# Patient Record
Sex: MALE | Race: WHITE | NOT HISPANIC OR LATINO | Employment: OTHER | URBAN - METROPOLITAN AREA
[De-identification: names, ages, dates, MRNs, and addresses within clinical notes are randomized per-mention and may not be internally consistent; named-entity substitution may affect disease eponyms.]

---

## 2017-01-16 ENCOUNTER — TRANSCRIBE ORDERS (OUTPATIENT)
Dept: ADMINISTRATIVE | Facility: HOSPITAL | Age: 82
End: 2017-01-16

## 2017-01-16 ENCOUNTER — HOSPITAL ENCOUNTER (OUTPATIENT)
Dept: CT IMAGING | Facility: HOSPITAL | Age: 82
Discharge: HOME | End: 2017-01-16
Attending: INTERNAL MEDICINE | Admitting: INTERNAL MEDICINE

## 2017-01-16 ENCOUNTER — LAB (OUTPATIENT)
Dept: LAB | Facility: HOSPITAL | Age: 82
End: 2017-01-16
Attending: INTERNAL MEDICINE

## 2017-01-16 DIAGNOSIS — J84.10 PULMONARY FIBROSIS (HCC): Primary | ICD-10-CM

## 2017-01-16 DIAGNOSIS — J84.10 PULMONARY FIBROSIS (HCC): ICD-10-CM

## 2017-01-16 DIAGNOSIS — R07.9 CHEST PAIN, UNSPECIFIED TYPE: ICD-10-CM

## 2017-01-16 DIAGNOSIS — R05.9 COUGH: ICD-10-CM

## 2017-01-16 DIAGNOSIS — R07.9 CHEST PAIN, UNSPECIFIED TYPE: Primary | ICD-10-CM

## 2017-01-16 LAB
B PERT DNA SPEC QL NAA+PROBE: NOT DETECTED
BASOPHILS # BLD AUTO: 0.02 10*3/MM3 (ref 0–0.2)
BASOPHILS NFR BLD AUTO: 0.2 % (ref 0–1.5)
C PNEUM DNA NPH QL NAA+NON-PROBE: NOT DETECTED
D DIMER PPP FEU-MCNC: 1.73 MCGFEU/ML (ref 0–0.49)
DEPRECATED RDW RBC AUTO: 47.2 FL (ref 37–54)
EOSINOPHIL # BLD AUTO: 0.31 10*3/MM3 (ref 0–0.7)
EOSINOPHIL NFR BLD AUTO: 3.7 % (ref 0.3–6.2)
ERYTHROCYTE [DISTWIDTH] IN BLOOD BY AUTOMATED COUNT: 13.3 % (ref 11.5–14.5)
FLUAV H1 2009 PAND RNA NPH QL NAA+PROBE: NOT DETECTED
FLUAV H1 HA GENE NPH QL NAA+PROBE: NOT DETECTED
FLUAV H3 RNA NPH QL NAA+PROBE: NOT DETECTED
FLUAV SUBTYP SPEC NAA+PROBE: NOT DETECTED
FLUBV RNA ISLT QL NAA+PROBE: NOT DETECTED
HADV DNA SPEC NAA+PROBE: NOT DETECTED
HCOV 229E RNA SPEC QL NAA+PROBE: NOT DETECTED
HCOV HKU1 RNA SPEC QL NAA+PROBE: NOT DETECTED
HCOV NL63 RNA SPEC QL NAA+PROBE: NOT DETECTED
HCOV OC43 RNA SPEC QL NAA+PROBE: NOT DETECTED
HCT VFR BLD AUTO: 34.5 % (ref 40.4–52.2)
HGB BLD-MCNC: 11.2 G/DL (ref 13.7–17.6)
HMPV RNA NPH QL NAA+NON-PROBE: NOT DETECTED
HPIV1 RNA SPEC QL NAA+PROBE: NOT DETECTED
HPIV2 RNA SPEC QL NAA+PROBE: NOT DETECTED
HPIV3 RNA NPH QL NAA+PROBE: NOT DETECTED
HPIV4 P GENE NPH QL NAA+PROBE: NOT DETECTED
IMM GRANULOCYTES # BLD: 0.03 10*3/MM3 (ref 0–0.03)
IMM GRANULOCYTES NFR BLD: 0.4 % (ref 0–0.5)
LYMPHOCYTES # BLD AUTO: 1.94 10*3/MM3 (ref 0.9–4.8)
LYMPHOCYTES NFR BLD AUTO: 22.9 % (ref 19.6–45.3)
M PNEUMO IGG SER IA-ACNC: NOT DETECTED
MCH RBC QN AUTO: 31.6 PG (ref 27–32.7)
MCHC RBC AUTO-ENTMCNC: 32.5 G/DL (ref 32.6–36.4)
MCV RBC AUTO: 97.5 FL (ref 79.8–96.2)
MONOCYTES # BLD AUTO: 0.61 10*3/MM3 (ref 0.2–1.2)
MONOCYTES NFR BLD AUTO: 7.2 % (ref 5–12)
NEUTROPHILS # BLD AUTO: 5.55 10*3/MM3 (ref 1.9–8.1)
NEUTROPHILS NFR BLD AUTO: 65.6 % (ref 42.7–76)
NT-PROBNP SERPL-MCNC: 712.3 PG/ML (ref 0–1800)
PLATELET # BLD AUTO: 181 10*3/MM3 (ref 140–500)
PMV BLD AUTO: 9.2 FL (ref 6–12)
PROCALCITONIN SERPL-MCNC: 0.07 NG/ML (ref 0.1–0.25)
RBC # BLD AUTO: 3.54 10*6/MM3 (ref 4.6–6)
RHINOVIRUS RNA SPEC NAA+PROBE: NOT DETECTED
RSV RNA NPH QL NAA+NON-PROBE: NOT DETECTED
TROPONIN T SERPL-MCNC: <0.01 NG/ML (ref 0–0.03)
WBC NRBC COR # BLD: 8.46 10*3/MM3 (ref 4.5–10.7)

## 2017-01-16 PROCEDURE — 84484 ASSAY OF TROPONIN QUANT: CPT

## 2017-01-16 PROCEDURE — 87798 DETECT AGENT NOS DNA AMP: CPT

## 2017-01-16 PROCEDURE — 36415 COLL VENOUS BLD VENIPUNCTURE: CPT

## 2017-01-16 PROCEDURE — 87633 RESP VIRUS 12-25 TARGETS: CPT

## 2017-01-16 PROCEDURE — 83880 ASSAY OF NATRIURETIC PEPTIDE: CPT

## 2017-01-16 PROCEDURE — 87581 M.PNEUMON DNA AMP PROBE: CPT

## 2017-01-16 PROCEDURE — 85025 COMPLETE CBC W/AUTO DIFF WBC: CPT

## 2017-01-16 PROCEDURE — 71250 CT THORAX DX C-: CPT

## 2017-01-16 PROCEDURE — 85379 FIBRIN DEGRADATION QUANT: CPT

## 2017-01-16 PROCEDURE — 84145 PROCALCITONIN (PCT): CPT

## 2017-01-16 PROCEDURE — 87486 CHLMYD PNEUM DNA AMP PROBE: CPT

## 2017-01-20 ENCOUNTER — TRANSCRIBE ORDERS (OUTPATIENT)
Dept: ADMINISTRATIVE | Facility: HOSPITAL | Age: 82
End: 2017-01-20

## 2017-01-20 DIAGNOSIS — R06.02 SOB (SHORTNESS OF BREATH): Primary | ICD-10-CM

## 2017-01-23 ENCOUNTER — HOSPITAL ENCOUNTER (OUTPATIENT)
Dept: NUCLEAR MEDICINE | Facility: HOSPITAL | Age: 82
Discharge: HOME | End: 2017-01-23
Attending: INTERNAL MEDICINE

## 2017-01-23 ENCOUNTER — HOSPITAL ENCOUNTER (OUTPATIENT)
Dept: GENERAL RADIOLOGY | Facility: HOSPITAL | Age: 82
Discharge: HOME | End: 2017-01-23
Attending: INTERNAL MEDICINE | Admitting: INTERNAL MEDICINE

## 2017-01-23 DIAGNOSIS — R06.02 SOB (SHORTNESS OF BREATH): ICD-10-CM

## 2017-01-23 PROCEDURE — 0 TECHNETIUM ALBUMIN AGGREGATED: Performed by: INTERNAL MEDICINE

## 2017-01-23 PROCEDURE — A9540 TC99M MAA: HCPCS | Performed by: INTERNAL MEDICINE

## 2017-01-23 PROCEDURE — A9558 XE133 XENON 10MCI: HCPCS | Performed by: INTERNAL MEDICINE

## 2017-01-23 PROCEDURE — 71020 HC CHEST PA AND LATERAL: CPT

## 2017-01-23 PROCEDURE — 0 XENON XE 133: Performed by: INTERNAL MEDICINE

## 2017-01-23 PROCEDURE — 78582 LUNG VENTILAT&PERFUS IMAGING: CPT

## 2017-01-23 RX ADMIN — Medication 13.5 MILLICURIE: at 14:30

## 2017-01-23 RX ADMIN — Medication 1 DOSE: at 14:31

## 2017-03-18 ENCOUNTER — RESULTS ENCOUNTER (OUTPATIENT)
Dept: FAMILY MEDICINE CLINIC | Facility: CLINIC | Age: 82
End: 2017-03-18

## 2017-03-18 DIAGNOSIS — K21.9 GASTROESOPHAGEAL REFLUX DISEASE, ESOPHAGITIS PRESENCE NOT SPECIFIED: ICD-10-CM

## 2017-03-18 DIAGNOSIS — Z12.5 SCREENING FOR PROSTATE CANCER: ICD-10-CM

## 2017-03-18 DIAGNOSIS — I10 ESSENTIAL HYPERTENSION: ICD-10-CM

## 2017-03-18 DIAGNOSIS — E78.2 MIXED HYPERLIPIDEMIA: ICD-10-CM

## 2017-03-18 DIAGNOSIS — E03.9 HYPOTHYROIDISM, ACQUIRED: ICD-10-CM

## 2017-03-18 DIAGNOSIS — G25.81 RLS (RESTLESS LEGS SYNDROME): ICD-10-CM

## 2017-04-05 DIAGNOSIS — G62.9 NEUROPATHY: ICD-10-CM

## 2017-04-05 RX ORDER — GABAPENTIN 300 MG/1
CAPSULE ORAL
Qty: 90 CAPSULE | Refills: 1 | OUTPATIENT
Start: 2017-04-05

## 2017-04-18 ENCOUNTER — OFFICE VISIT (OUTPATIENT)
Dept: FAMILY MEDICINE CLINIC | Facility: CLINIC | Age: 82
End: 2017-04-18

## 2017-04-18 VITALS
SYSTOLIC BLOOD PRESSURE: 105 MMHG | HEART RATE: 63 BPM | WEIGHT: 126 LBS | HEIGHT: 71 IN | RESPIRATION RATE: 16 BRPM | TEMPERATURE: 97.3 F | DIASTOLIC BLOOD PRESSURE: 66 MMHG | OXYGEN SATURATION: 100 % | BODY MASS INDEX: 17.64 KG/M2

## 2017-04-18 DIAGNOSIS — G25.81 RLS (RESTLESS LEGS SYNDROME): ICD-10-CM

## 2017-04-18 DIAGNOSIS — E03.9 HYPOTHYROIDISM, ACQUIRED: ICD-10-CM

## 2017-04-18 DIAGNOSIS — R41.3 MEMORY IMPAIRMENT: ICD-10-CM

## 2017-04-18 DIAGNOSIS — G62.9 NEUROPATHY: ICD-10-CM

## 2017-04-18 DIAGNOSIS — K21.9 GASTROESOPHAGEAL REFLUX DISEASE WITHOUT ESOPHAGITIS: ICD-10-CM

## 2017-04-18 DIAGNOSIS — I10 ESSENTIAL HYPERTENSION: ICD-10-CM

## 2017-04-18 DIAGNOSIS — K21.9 GASTROESOPHAGEAL REFLUX DISEASE, ESOPHAGITIS PRESENCE NOT SPECIFIED: ICD-10-CM

## 2017-04-18 PROCEDURE — 99214 OFFICE O/P EST MOD 30 MIN: CPT | Performed by: FAMILY MEDICINE

## 2017-04-18 RX ORDER — GABAPENTIN 300 MG/1
300 CAPSULE ORAL DAILY
Qty: 90 CAPSULE | Refills: 1 | Status: SHIPPED | OUTPATIENT
Start: 2017-04-18 | End: 2017-07-06 | Stop reason: SDUPTHER

## 2017-04-18 RX ORDER — DONEPEZIL HYDROCHLORIDE 10 MG/1
10 TABLET, FILM COATED ORAL NIGHTLY
Qty: 90 TABLET | Refills: 1 | Status: CANCELLED | OUTPATIENT
Start: 2017-04-18

## 2017-04-18 RX ORDER — LEVOTHYROXINE SODIUM 0.05 MG/1
50 TABLET ORAL DAILY
Qty: 90 TABLET | Refills: 1 | Status: SHIPPED | OUTPATIENT
Start: 2017-04-18 | End: 2017-06-16

## 2017-04-18 RX ORDER — PRAVASTATIN SODIUM 20 MG
20 TABLET ORAL DAILY
Qty: 90 TABLET | Refills: 1 | Status: SHIPPED | OUTPATIENT
Start: 2017-04-18 | End: 2018-02-06 | Stop reason: SDUPTHER

## 2017-04-18 RX ORDER — PANTOPRAZOLE SODIUM 40 MG/1
40 TABLET, DELAYED RELEASE ORAL DAILY
Qty: 90 TABLET | Refills: 1 | Status: SHIPPED | OUTPATIENT
Start: 2017-04-18 | End: 2017-10-23 | Stop reason: SDUPTHER

## 2017-04-18 RX ORDER — ROPINIROLE 1 MG/1
1 TABLET, FILM COATED ORAL DAILY
Qty: 90 TABLET | Refills: 1 | Status: SHIPPED | OUTPATIENT
Start: 2017-04-18 | End: 2017-10-23 | Stop reason: SDUPTHER

## 2017-04-18 RX ORDER — LISINOPRIL 2.5 MG/1
2.5 TABLET ORAL DAILY
Qty: 90 TABLET | Refills: 1 | Status: SHIPPED | OUTPATIENT
Start: 2017-04-18 | End: 2018-02-06 | Stop reason: SDUPTHER

## 2017-04-18 NOTE — PROGRESS NOTES
Subjective   Tye Connor is a 84 y.o. male.     History of Present Illness   Chief Complaint:   Chief Complaint   Patient presents with   • Heartburn   • Hyperlipidemia   • Hypertension   • Memory Loss   • Restless Legs Syndrome       Tye Connor 84 y.o. male who presents today for Medical Management of the below listed issues and medication refills. He did not have labs requested prior to his appointment. Patient stopped taking his Aricept. He stated he does not want to continue this medication. Patient will have immunization records faxed to us from [facility].   he has a history of   Patient Active Problem List   Diagnosis   • Back pain   • Hx of CABG   • Hyperlipidemia   • Hypothyroidism, acquired   • Kidney calculus   • Neuropathy   • History of carotid endarterectomy   • Essential hypertension   • Acid reflux   • Gastric ulcer   • Compression fracture of thoracic vertebra   • Hypertrophic polyarthritis   • Gait instability   • Memory impairment   • Pulmonary fibrosis   .  Since the last visit, he has overall felt well.  he has been compliant with   Current Outpatient Prescriptions:   •  calcium citrate-vitamin d (CALCITRATE) 315-250 MG-UNIT tablet tablet, Take  by mouth daily., Disp: , Rfl:   •  cyanocobalamin 100 MCG tablet, Take by mouth., Disp: , Rfl:   •  gabapentin (NEURONTIN) 300 MG capsule, Take 1 capsule by mouth Daily., Disp: 90 capsule, Rfl: 1  •  Ginkgo Biloba 100 MG capsule, Take  by mouth., Disp: , Rfl:   •  guaifenesin-codeine (GUAIFENESIN AC) 100-10 MG/5ML liquid, Take 5 mL by mouth 3 (three) times a day as needed for cough., Disp: 120 mL, Rfl: 0  •  levothyroxine (SYNTHROID, LEVOTHROID) 50 MCG tablet, Take 1 tablet by mouth Daily., Disp: 90 tablet, Rfl: 0  •  lisinopril (PRINIVIL,ZESTRIL) 2.5 MG tablet, Take 1 tablet by mouth Daily., Disp: 90 tablet, Rfl: 1  •  Multiple Vitamins-Minerals (PRESERVISION AREDS) capsule, Take by mouth., Disp: , Rfl:   •  pantoprazole (PROTONIX) 40 MG EC  "tablet, Take 1 tablet by mouth Daily., Disp: 90 tablet, Rfl: 1  •  pravastatin (PRAVACHOL) 20 MG tablet, Take 1 tablet by mouth Daily., Disp: 90 tablet, Rfl: 1  •  rOPINIRole (REQUIP) 1 MG tablet, Take 1 tablet by mouth Daily., Disp: 90 tablet, Rfl: 1.  he denies medication side effects.    All of the chronic condition(s) listed above are stable w/o issues.    /66  Pulse 63  Temp 97.3 °F (36.3 °C) (Oral)   Resp 16  Ht 71\" (180.3 cm)  Wt 126 lb (57.2 kg)  SpO2 100%  BMI 17.57 kg/m2    The following portions of the patient's history were reviewed and updated as appropriate: allergies, current medications, past family history, past medical history, past social history, past surgical history and problem list.    Review of Systems   Constitutional: Negative for activity change, appetite change and unexpected weight change.   Eyes: Negative for visual disturbance.   Respiratory: Negative for chest tightness and shortness of breath.    Cardiovascular: Negative for chest pain and palpitations.   Skin: Negative for color change.   Neurological: Negative for syncope and speech difficulty.   Psychiatric/Behavioral: Negative for decreased concentration.       Objective   Physical Exam   Constitutional: He is oriented to person, place, and time. He appears well-developed and well-nourished.   HENT:   Head: Atraumatic.   Mouth/Throat: Oropharynx is clear and moist.   Eyes: EOM are normal. Pupils are equal, round, and reactive to light.   Neck: Normal range of motion. Neck supple. No thyromegaly present.   Cardiovascular: Normal rate and regular rhythm.    Pulmonary/Chest: Effort normal and breath sounds normal.   Abdominal: Soft.   Musculoskeletal: Normal range of motion.   Neurological: He is alert and oriented to person, place, and time.   Skin: Skin is warm and dry.   Psychiatric: He has a normal mood and affect. His behavior is normal.   Nursing note and vitals reviewed.      Assessment/Plan   Tye was seen " today for heartburn, hyperlipidemia, hypertension, memory loss and restless legs syndrome.    Diagnoses and all orders for this visit:    Memory impairment  -     Ambulatory Referral to Neurology    Neuropathy  -     gabapentin (NEURONTIN) 300 MG capsule; Take 1 capsule by mouth Daily.  -     Ambulatory Referral to Neurology    Hypothyroidism, acquired  -     levothyroxine (SYNTHROID, LEVOTHROID) 50 MCG tablet; Take 1 tablet by mouth Daily.    Essential hypertension  -     lisinopril (PRINIVIL,ZESTRIL) 2.5 MG tablet; Take 1 tablet by mouth Daily.    Gastroesophageal reflux disease without esophagitis  -     pantoprazole (PROTONIX) 40 MG EC tablet; Take 1 tablet by mouth Daily.    Gastroesophageal reflux disease, esophagitis presence not specified  -     pravastatin (PRAVACHOL) 20 MG tablet; Take 1 tablet by mouth Daily.    RLS (restless legs syndrome)  -     rOPINIRole (REQUIP) 1 MG tablet; Take 1 tablet by mouth Daily.    Other orders  -     Cancel: donepezil (ARICEPT) 10 MG tablet; Take 1 tablet by mouth Every Night.

## 2017-04-18 NOTE — PATIENT INSTRUCTIONS
Exercise 30 minutes most days of the week  Sleep 6-8 hours each night if possible  Low fat, low cholesterol diet   we discussed prescribed medications and how to take them   make sure you get results of any labs/studies ordered today  Low glycemic index diet   Get labs and see me

## 2017-04-25 LAB
ALBUMIN SERPL-MCNC: 3.5 G/DL (ref 3.5–5.2)
ALBUMIN/GLOB SERPL: 0.8 G/DL
ALP SERPL-CCNC: 68 U/L (ref 39–117)
ALT SERPL-CCNC: 11 U/L (ref 1–41)
AST SERPL-CCNC: 21 U/L (ref 1–40)
BASOPHILS # BLD AUTO: 0.01 10*3/MM3 (ref 0–0.2)
BASOPHILS NFR BLD AUTO: 0.1 % (ref 0–1.5)
BILIRUB SERPL-MCNC: 0.7 MG/DL (ref 0.1–1.2)
BUN SERPL-MCNC: 15 MG/DL (ref 8–23)
BUN/CREAT SERPL: 13.9 (ref 7–25)
CALCIUM SERPL-MCNC: 9.4 MG/DL (ref 8.6–10.5)
CHLORIDE SERPL-SCNC: 100 MMOL/L (ref 98–107)
CHOLEST SERPL-MCNC: 150 MG/DL (ref 0–200)
CO2 SERPL-SCNC: 26.3 MMOL/L (ref 22–29)
CREAT SERPL-MCNC: 1.08 MG/DL (ref 0.76–1.27)
EOSINOPHIL # BLD AUTO: 0.23 10*3/MM3 (ref 0–0.7)
EOSINOPHIL NFR BLD AUTO: 3.1 % (ref 0.3–6.2)
ERYTHROCYTE [DISTWIDTH] IN BLOOD BY AUTOMATED COUNT: 14.2 % (ref 11.5–14.5)
GLOBULIN SER CALC-MCNC: 4.2 GM/DL
GLUCOSE SERPL-MCNC: 94 MG/DL (ref 65–99)
HCT VFR BLD AUTO: 34.4 % (ref 40.4–52.2)
HDLC SERPL-MCNC: 41 MG/DL (ref 40–60)
HGB BLD-MCNC: 11.2 G/DL (ref 13.7–17.6)
IMM GRANULOCYTES # BLD: 0.03 10*3/MM3 (ref 0–0.03)
IMM GRANULOCYTES NFR BLD: 0.4 % (ref 0–0.5)
LDLC SERPL CALC-MCNC: 87 MG/DL (ref 0–100)
LYMPHOCYTES # BLD AUTO: 2.8 10*3/MM3 (ref 0.9–4.8)
LYMPHOCYTES NFR BLD AUTO: 38.2 % (ref 19.6–45.3)
MCH RBC QN AUTO: 31.7 PG (ref 27–32.7)
MCHC RBC AUTO-ENTMCNC: 32.6 G/DL (ref 32.6–36.4)
MCV RBC AUTO: 97.5 FL (ref 79.8–96.2)
MONOCYTES # BLD AUTO: 0.55 10*3/MM3 (ref 0.2–1.2)
MONOCYTES NFR BLD AUTO: 7.5 % (ref 5–12)
NEUTROPHILS # BLD AUTO: 3.71 10*3/MM3 (ref 1.9–8.1)
NEUTROPHILS NFR BLD AUTO: 50.7 % (ref 42.7–76)
PLATELET # BLD AUTO: 191 10*3/MM3 (ref 140–500)
POTASSIUM SERPL-SCNC: 4.1 MMOL/L (ref 3.5–5.2)
PROT SERPL-MCNC: 7.7 G/DL (ref 6–8.5)
PSA SERPL-MCNC: 0.56 NG/ML (ref 0–4)
RBC # BLD AUTO: 3.53 10*6/MM3 (ref 4.6–6)
SODIUM SERPL-SCNC: 140 MMOL/L (ref 136–145)
TRIGL SERPL-MCNC: 110 MG/DL (ref 0–150)
TSH SERPL DL<=0.005 MIU/L-ACNC: 5.68 MIU/ML (ref 0.27–4.2)
VLDLC SERPL CALC-MCNC: 22 MG/DL (ref 5–40)
WBC # BLD AUTO: 7.33 10*3/MM3 (ref 4.5–10.7)

## 2017-05-03 ENCOUNTER — OFFICE VISIT (OUTPATIENT)
Dept: FAMILY MEDICINE CLINIC | Facility: CLINIC | Age: 82
End: 2017-05-03

## 2017-05-03 VITALS
RESPIRATION RATE: 16 BRPM | BODY MASS INDEX: 18.06 KG/M2 | OXYGEN SATURATION: 98 % | TEMPERATURE: 96.8 F | HEIGHT: 71 IN | WEIGHT: 129 LBS | SYSTOLIC BLOOD PRESSURE: 82 MMHG | HEART RATE: 72 BPM | DIASTOLIC BLOOD PRESSURE: 50 MMHG

## 2017-05-03 DIAGNOSIS — E78.2 MIXED HYPERLIPIDEMIA: ICD-10-CM

## 2017-05-03 DIAGNOSIS — D50.9 IRON DEFICIENCY ANEMIA, UNSPECIFIED IRON DEFICIENCY ANEMIA TYPE: ICD-10-CM

## 2017-05-03 DIAGNOSIS — E03.9 HYPOTHYROIDISM, ACQUIRED: Primary | ICD-10-CM

## 2017-05-03 DIAGNOSIS — I10 ESSENTIAL HYPERTENSION: ICD-10-CM

## 2017-05-03 PROCEDURE — 99213 OFFICE O/P EST LOW 20 MIN: CPT | Performed by: FAMILY MEDICINE

## 2017-06-16 ENCOUNTER — OFFICE VISIT (OUTPATIENT)
Dept: NEUROLOGY | Facility: CLINIC | Age: 82
End: 2017-06-16

## 2017-06-16 VITALS
DIASTOLIC BLOOD PRESSURE: 56 MMHG | WEIGHT: 125 LBS | OXYGEN SATURATION: 98 % | BODY MASS INDEX: 18.51 KG/M2 | HEART RATE: 64 BPM | SYSTOLIC BLOOD PRESSURE: 98 MMHG | HEIGHT: 69 IN

## 2017-06-16 DIAGNOSIS — R26.81 GAIT INSTABILITY: ICD-10-CM

## 2017-06-16 DIAGNOSIS — R41.3 MEMORY IMPAIRMENT: ICD-10-CM

## 2017-06-16 DIAGNOSIS — G62.9 NEUROPATHY: ICD-10-CM

## 2017-06-16 PROCEDURE — 99213 OFFICE O/P EST LOW 20 MIN: CPT | Performed by: PSYCHIATRY & NEUROLOGY

## 2017-06-16 RX ORDER — RIVASTIGMINE 4.6 MG/24H
1 PATCH, EXTENDED RELEASE TRANSDERMAL DAILY
Qty: 30 PATCH | Refills: 5 | Status: SHIPPED | OUTPATIENT
Start: 2017-06-16 | End: 2017-07-16

## 2017-06-16 RX ORDER — LEVOTHYROXINE SODIUM 0.07 MG/1
75 TABLET ORAL DAILY
COMMUNITY
End: 2017-07-05 | Stop reason: SDUPTHER

## 2017-06-16 NOTE — PROGRESS NOTES
Subjective   Tye Connor is a 84 y.o. male with history of memory impairment with neuropathy and gait instability came for follow-up appointment and his previous visit was 1 year ago.    History of Present Illness  He was accompanied by his wife and daughter and according to them, he has fluctuations in his memory and sometimes has trouble remembering familiar names and patient is currently not driving and tried donepezil in the past and discontinued due to side effects with severe diarrhea.  Denies any hallucinations, resting tremor, cogwheel rigidity but complaining of occasional tremors in the right upper extremity with balance issues when walking and falls and denies any urinary incontinence issues.  Also complaining of tingling and numbness and denies any worsening and complaint with the medication gabapentin and denies any side effects.  Denies any headaches, blurred vision, double vision, weakness, passing out spells or recent hospitalizations since last visit.  Denies any major depression or anxiety issues.    The following portions of the patient's history were reviewed and updated as appropriate: allergies, current medications, past family history, past medical history, past social history, past surgical history and problem list.    Review of Systems   Constitutional: Negative for chills, fatigue and fever.   HENT: Negative for hearing loss, tinnitus and trouble swallowing.    Eyes: Positive for photophobia, itching and visual disturbance.   Respiratory: Negative for cough, shortness of breath and wheezing.    Cardiovascular: Negative for chest pain, palpitations and leg swelling.   Gastrointestinal: Negative for constipation, diarrhea, nausea and vomiting.   Endocrine: Negative for cold intolerance and heat intolerance.   Genitourinary: Negative for decreased urine volume, difficulty urinating, frequency and urgency.   Musculoskeletal: Positive for back pain, gait problem (uses a walker) and neck  pain.   Skin: Negative for color change, rash and wound.   Allergic/Immunologic: Negative for environmental allergies and food allergies.   Neurological: Positive for weakness and numbness (legs and arms). Negative for dizziness and headaches.   Hematological: Negative for adenopathy. Does not bruise/bleed easily.   Psychiatric/Behavioral: Negative for confusion and sleep disturbance. The patient is not nervous/anxious.        Objective   Physical Exam   Vitals reviewed.    GENERAL EXAMINATION : Patient is well-developed, well-nourished, well-groomed, alert and approriate in no apparent distress.  HEENT: Normocephalic, normal funduscopic exam, no visible oral lesions.  NECK : Normal range of motion, no tenderness, no malalignment.  CARDIAC : Regular rate and rhythm, no murmurs.  CHEST : Clear to auscultation, bilateral.   No wheezing .  ABDOMEN : Soft, non tender and non distended. EXTREMITIES: No edema. SKIN : No rashes or lesions visible. MUSCULOSKELETAL : No muscle weakness or muscle pain. No joint pains. PSYCHIATRIC : Awake and follwoing verbal commands well.     NEUROLOGICAL EXAMINATION  :  Higher integrative functions : No aphasia or dysarthria.  MMSE 27/30.  CRANIAL NERVES : Cranial nerve II: Normal visual acuity and visual fields.  On funduscopic exam, discs are flat with sharp margins cranial nerves III, IV, VI: Extraocular movements are full without nystagmus; pupils are equal, round and reactive to light.  Cranial nerve V: Normal facial sensation and strength of muscles of mastication.  Cranial nerve: VII: Facial movements are symmetric.  No weakness.  Cranial nerve VIII: Auditory acuity is normal.  Cranial nerve IX, X: Symmetric palate movement.  Cranial nerve XI sternocleidomastoid and trapezius are normal.  Cranial nerve XII: Tongue in the midline with no atrophy or fasciculations.      MOTOR : Normal muscle strength and tone.  SENSATION : Normal to light touch, pinprick, vibration sensations intact  and Romberg is negative.  MUSCLE STRETCH REFLEXES : Normal and symmetric in the upper extremities and lower extremities and the plantar responses are flexor bilaterally. COORDINATION : Finger to nose test showed no dysmetria.  Rapid alternating movements are normal.   GAIT : Walks on heels, toes, except for difficulty with tandem walk and using a walker.    Assessment/Plan   Tye was seen today for memory loss, gait problem and peripheral neuropathy.    Diagnoses and all orders for this visit:    Memory impairment    Gait instability    Neuropathy  -     Protein Elec + Interp, Serum; Future    Other orders  -     rivastigmine (EXELON) 4.6 MG/24HR patch; Place 1 patch on the skin Daily for 30 days.    84-year-old right-handed white male with history of memory impairment with neuropathy and gait instability issues came for follow-up appointment after one year.  On exam, no new focal deficits were seen except for difficulty with tandem walk and using a walker and MMSE 27/30.  Ordered for CSF cisternogram during his last visit but patient decided not to go for further testing and also discussed about referral to neurosurgeon to evaluate for NPH and patient wants to hold on for now and will consider in future for worsening of the symptoms.  Will start Exelon patch 4.6 mg/24 hours to apply daily and discussed about side effects.  Discussed about brain exercises to help with the memory issues.  Ordered for SPEP to evaluate for neuropathic symptoms and patient was seen by a hematologist and got workup for Gammopathy.  Told him to increase the dose of gabapentin to 300 mg 2 capsules by mouth at bedtime to help with the neuropathic symptoms discussed about side effects.  Discussed about further evaluation with EMG/NCV studies and patient wants to hold on for now and will consider in future for worsening of the symptoms and will consider adding Cymbalta in future for worsening of neuropathic symptoms.  Discussed about fall  precautions and told him to continue physical activity and will consider physical therapy in future for worsening of balance issues.  Will schedule follow-up appointment with Dr. Laird in 4 months.    Thank you for allowing me to participate in the care of the patient.  Please feel free to call for any questions at your convenience.    Yours sincerely,    Gabbie Wolf M.D

## 2017-06-19 ENCOUNTER — OFFICE VISIT (OUTPATIENT)
Dept: FAMILY MEDICINE CLINIC | Facility: CLINIC | Age: 82
End: 2017-06-19

## 2017-06-19 VITALS
RESPIRATION RATE: 16 BRPM | OXYGEN SATURATION: 94 % | HEIGHT: 69 IN | WEIGHT: 125 LBS | BODY MASS INDEX: 18.51 KG/M2 | DIASTOLIC BLOOD PRESSURE: 58 MMHG | HEART RATE: 88 BPM | TEMPERATURE: 97.3 F | SYSTOLIC BLOOD PRESSURE: 98 MMHG

## 2017-06-19 DIAGNOSIS — R41.3 MEMORY IMPAIRMENT: ICD-10-CM

## 2017-06-19 DIAGNOSIS — G89.29 CHRONIC BILATERAL LOW BACK PAIN WITHOUT SCIATICA: Primary | ICD-10-CM

## 2017-06-19 DIAGNOSIS — M54.50 CHRONIC BILATERAL LOW BACK PAIN WITHOUT SCIATICA: Primary | ICD-10-CM

## 2017-06-19 DIAGNOSIS — E03.9 HYPOTHYROIDISM, ACQUIRED: ICD-10-CM

## 2017-06-19 PROCEDURE — 99214 OFFICE O/P EST MOD 30 MIN: CPT | Performed by: FAMILY MEDICINE

## 2017-06-19 NOTE — PATIENT INSTRUCTIONS
Exercise 30 minutes most days of the week  Sleep 6-8 hours each night if possible  Low fat, low cholesterol diet   we discussed prescribed medications and how to take them   make sure you get results of any labs/studies ordered today  Low glycemic index diet labs due 8/1 and see me 8/4 as planned

## 2017-06-19 NOTE — PROGRESS NOTES
Subjective   Tye Connor is a 84 y.o. male.     History of Present Illness   Chief Complaint:   Chief Complaint   Patient presents with   • Hypothyroidism       Tye Connor 84 y.o. male who presents today for a month follow up for hypothyroidism. At his last appointment I increased his Levothyroxine to 75 mcg and his Gabapentin was increased to 300 mg BID by his Neurologist. He stated that he still feels the same. He still complains of fatigue and weakness.  he has a history of   Patient Active Problem List   Diagnosis   • Back pain   • Hx of CABG   • Hyperlipidemia   • Hypothyroidism, acquired   • Kidney calculus   • Neuropathy   • History of carotid endarterectomy   • Essential hypertension   • Acid reflux   • Gastric ulcer   • Compression fracture of thoracic vertebra   • Hypertrophic polyarthritis   • Gait instability   • Memory impairment   • Pulmonary fibrosis   .  Since the last visit, he has overall felt well.  he has been compliant with   Current Outpatient Prescriptions:   •  calcium citrate-vitamin d (CALCITRATE) 315-250 MG-UNIT tablet tablet, Take  by mouth daily., Disp: , Rfl:   •  cyanocobalamin 100 MCG tablet, Take by mouth., Disp: , Rfl:   •  Diphenhydramine-APAP, sleep, (ACETAMINOPHEN PM PO), Take  by mouth As Needed., Disp: , Rfl:   •  gabapentin (NEURONTIN) 300 MG capsule, Take 1 capsule by mouth Daily. (Patient taking differently: Take 300 mg by mouth 2 (Two) Times a Day.), Disp: 90 capsule, Rfl: 1  •  Ginkgo Biloba 100 MG capsule, Take  by mouth., Disp: , Rfl:   •  levothyroxine (SYNTHROID, LEVOTHROID) 75 MCG tablet, Take 75 mcg by mouth Daily., Disp: , Rfl:   •  lisinopril (PRINIVIL,ZESTRIL) 2.5 MG tablet, Take 1 tablet by mouth Daily., Disp: 90 tablet, Rfl: 1  •  Multiple Vitamins-Minerals (PRESERVISION AREDS) capsule, Take by mouth., Disp: , Rfl:   •  pantoprazole (PROTONIX) 40 MG EC tablet, Take 1 tablet by mouth Daily., Disp: 90 tablet, Rfl: 1  •  pravastatin (PRAVACHOL) 20 MG  "tablet, Take 1 tablet by mouth Daily., Disp: 90 tablet, Rfl: 1  •  rivastigmine (EXELON) 4.6 MG/24HR patch, Place 1 patch on the skin Daily for 30 days., Disp: 30 patch, Rfl: 5  •  rOPINIRole (REQUIP) 1 MG tablet, Take 1 tablet by mouth Daily., Disp: 90 tablet, Rfl: 1.  he denies medication side effects.    All of the chronic condition(s) listed above are stable w/o issues.    BP 98/58  Pulse 88  Temp 97.3 °F (36.3 °C) (Oral)   Resp 16  Ht 69\" (175.3 cm)  Wt 125 lb (56.7 kg)  SpO2 94%  BMI 18.46 kg/m2    The following portions of the patient's history were reviewed and updated as appropriate: allergies, current medications, past family history, past medical history, past social history, past surgical history and problem list.    Review of Systems   Constitutional: Positive for fatigue. Negative for activity change, appetite change and unexpected weight change.   Eyes: Negative for visual disturbance.   Respiratory: Negative for chest tightness and shortness of breath.    Cardiovascular: Negative for chest pain and palpitations.   Skin: Negative for color change.   Neurological: Positive for weakness. Negative for syncope and speech difficulty.   Psychiatric/Behavioral: Negative for confusion and decreased concentration.       Objective   Physical Exam   Constitutional: He is oriented to person, place, and time. He appears well-developed and well-nourished.   HENT:   Mouth/Throat: Oropharynx is clear and moist.   Eyes: EOM are normal. Pupils are equal, round, and reactive to light.   Neck: Normal range of motion. Neck supple.   Cardiovascular: Normal rate and regular rhythm.    Pulmonary/Chest: Effort normal and breath sounds normal.   Abdominal: Soft.   Musculoskeletal:   Weakness legs walkinf  Walks with walker   Lymphadenopathy:     He has no cervical adenopathy.   Neurological: He is alert and oriented to person, place, and time.   Weakness and numbness legs   Skin: Skin is warm. No rash noted. "   Psychiatric: He has a normal mood and affect. His behavior is normal.   Nursing note and vitals reviewed.      Assessment/Plan   Tye was seen today for hypothyroidism.    Diagnoses and all orders for this visit:    Chronic bilateral low back pain without sciatica    Hypothyroidism, acquired    Memory impairment

## 2017-07-05 ENCOUNTER — TRANSCRIBE ORDERS (OUTPATIENT)
Dept: ADMINISTRATIVE | Facility: HOSPITAL | Age: 82
End: 2017-07-05

## 2017-07-05 DIAGNOSIS — G62.9 NEUROPATHY: ICD-10-CM

## 2017-07-05 DIAGNOSIS — J84.9 ILD (INTERSTITIAL LUNG DISEASE) (HCC): Primary | ICD-10-CM

## 2017-07-05 RX ORDER — LEVOTHYROXINE SODIUM 0.07 MG/1
75 TABLET ORAL DAILY
Qty: 90 TABLET | Refills: 0 | Status: SHIPPED | OUTPATIENT
Start: 2017-07-05 | End: 2017-11-10 | Stop reason: SDUPTHER

## 2017-07-06 RX ORDER — GABAPENTIN 300 MG/1
300 CAPSULE ORAL 2 TIMES DAILY
Qty: 180 CAPSULE | Refills: 1 | Status: SHIPPED | OUTPATIENT
Start: 2017-07-06 | End: 2018-02-20 | Stop reason: SDUPTHER

## 2017-07-07 ENCOUNTER — HOSPITAL ENCOUNTER (OUTPATIENT)
Dept: CT IMAGING | Facility: HOSPITAL | Age: 82
Discharge: HOME | End: 2017-07-07
Attending: INTERNAL MEDICINE | Admitting: INTERNAL MEDICINE

## 2017-07-07 DIAGNOSIS — J84.9 ILD (INTERSTITIAL LUNG DISEASE) (HCC): ICD-10-CM

## 2017-07-07 PROCEDURE — 71250 CT THORAX DX C-: CPT

## 2017-08-01 LAB
BASOPHILS # BLD AUTO: 0.02 10*3/MM3 (ref 0–0.2)
BASOPHILS NFR BLD AUTO: 0.2 % (ref 0–1.5)
EOSINOPHIL # BLD AUTO: 0.3 10*3/MM3 (ref 0–0.7)
EOSINOPHIL NFR BLD AUTO: 3.7 % (ref 0.3–6.2)
ERYTHROCYTE [DISTWIDTH] IN BLOOD BY AUTOMATED COUNT: 13.7 % (ref 11.5–14.5)
HCT VFR BLD AUTO: 35.8 % (ref 40.4–52.2)
HGB BLD-MCNC: 11.2 G/DL (ref 13.7–17.6)
IMM GRANULOCYTES # BLD: 0.03 10*3/MM3 (ref 0–0.03)
IMM GRANULOCYTES NFR BLD: 0.4 % (ref 0–0.5)
IRON SATN MFR SERPL: 24 % (ref 20–50)
IRON SERPL-MCNC: 77 MCG/DL (ref 59–158)
LYMPHOCYTES # BLD AUTO: 2.33 10*3/MM3 (ref 0.9–4.8)
LYMPHOCYTES NFR BLD AUTO: 29 % (ref 19.6–45.3)
MCH RBC QN AUTO: 31.4 PG (ref 27–32.7)
MCHC RBC AUTO-ENTMCNC: 31.3 G/DL (ref 32.6–36.4)
MCV RBC AUTO: 100.3 FL (ref 79.8–96.2)
MONOCYTES # BLD AUTO: 0.58 10*3/MM3 (ref 0.2–1.2)
MONOCYTES NFR BLD AUTO: 7.2 % (ref 5–12)
NEUTROPHILS # BLD AUTO: 4.77 10*3/MM3 (ref 1.9–8.1)
NEUTROPHILS NFR BLD AUTO: 59.5 % (ref 42.7–76)
PLATELET # BLD AUTO: 204 10*3/MM3 (ref 140–500)
RBC # BLD AUTO: 3.57 10*6/MM3 (ref 4.6–6)
TIBC SERPL-MCNC: 322 MCG/DL
TSH SERPL DL<=0.005 MIU/L-ACNC: 3.64 MIU/ML (ref 0.27–4.2)
UIBC SERPL-MCNC: 245 MCG/DL
VIT B12 SERPL-MCNC: 984 PG/ML (ref 211–946)
WBC # BLD AUTO: 8.03 10*3/MM3 (ref 4.5–10.7)

## 2017-08-03 ENCOUNTER — RESULTS ENCOUNTER (OUTPATIENT)
Dept: FAMILY MEDICINE CLINIC | Facility: CLINIC | Age: 82
End: 2017-08-03

## 2017-08-03 DIAGNOSIS — E78.2 MIXED HYPERLIPIDEMIA: ICD-10-CM

## 2017-08-03 DIAGNOSIS — E03.9 HYPOTHYROIDISM, ACQUIRED: ICD-10-CM

## 2017-08-03 DIAGNOSIS — I10 ESSENTIAL HYPERTENSION: ICD-10-CM

## 2017-08-03 DIAGNOSIS — D50.9 IRON DEFICIENCY ANEMIA, UNSPECIFIED IRON DEFICIENCY ANEMIA TYPE: ICD-10-CM

## 2017-08-04 ENCOUNTER — OFFICE VISIT (OUTPATIENT)
Dept: FAMILY MEDICINE CLINIC | Facility: CLINIC | Age: 82
End: 2017-08-04

## 2017-08-04 VITALS
HEART RATE: 73 BPM | SYSTOLIC BLOOD PRESSURE: 91 MMHG | DIASTOLIC BLOOD PRESSURE: 55 MMHG | TEMPERATURE: 97.3 F | RESPIRATION RATE: 16 BRPM | HEIGHT: 69 IN | WEIGHT: 126 LBS | OXYGEN SATURATION: 98 % | BODY MASS INDEX: 18.66 KG/M2

## 2017-08-04 DIAGNOSIS — E03.9 HYPOTHYROIDISM, ACQUIRED: Primary | ICD-10-CM

## 2017-08-04 DIAGNOSIS — R53.83 OTHER FATIGUE: ICD-10-CM

## 2017-08-04 DIAGNOSIS — D50.9 IRON DEFICIENCY ANEMIA, UNSPECIFIED IRON DEFICIENCY ANEMIA TYPE: ICD-10-CM

## 2017-08-04 PROCEDURE — 99213 OFFICE O/P EST LOW 20 MIN: CPT | Performed by: FAMILY MEDICINE

## 2017-08-04 RX ORDER — DONEPEZIL HYDROCHLORIDE 10 MG/1
10 TABLET, FILM COATED ORAL NIGHTLY
COMMUNITY
Start: 2017-07-05 | End: 2017-10-11

## 2017-08-04 NOTE — PROGRESS NOTES
Subjective   Tye Connor is a 84 y.o. male.     History of Present Illness   Chief Complaint:   Chief Complaint   Patient presents with   • Hypothyroidism   • Fatigue       Tye Connor 84 y.o. male who presents today to follow up on hypothyroidism and fatigue. I reviewed his lab results. Hb stable 11.2    Iron level better 77   Take  Feosol  1/day.  he has a history of   Patient Active Problem List   Diagnosis   • Back pain   • Hx of CABG   • Hyperlipidemia   • Hypothyroidism, acquired   • Kidney calculus   • Neuropathy   • History of carotid endarterectomy   • Essential hypertension   • Acid reflux   • Gastric ulcer   • Compression fracture of thoracic vertebra   • Hypertrophic polyarthritis   • Gait instability   • Memory impairment   • Pulmonary fibrosis   .  Since the last visit, he has overall felt well.  he has been compliant with   Current Outpatient Prescriptions:   •  calcium citrate-vitamin d (CALCITRATE) 315-250 MG-UNIT tablet tablet, Take  by mouth daily., Disp: , Rfl:   •  cyanocobalamin 100 MCG tablet, Take by mouth., Disp: , Rfl:   •  Diphenhydramine-APAP, sleep, (ACETAMINOPHEN PM PO), Take  by mouth As Needed., Disp: , Rfl:   •  donepezil (ARICEPT) 10 MG tablet, , Disp: , Rfl:   •  gabapentin (NEURONTIN) 300 MG capsule, Take 1 capsule by mouth 2 (Two) Times a Day., Disp: 180 capsule, Rfl: 1  •  Ginkgo Biloba 100 MG capsule, Take  by mouth., Disp: , Rfl:   •  levothyroxine (SYNTHROID, LEVOTHROID) 75 MCG tablet, Take 1 tablet by mouth Daily., Disp: 90 tablet, Rfl: 0  •  lisinopril (PRINIVIL,ZESTRIL) 2.5 MG tablet, Take 1 tablet by mouth Daily., Disp: 90 tablet, Rfl: 1  •  Multiple Vitamins-Minerals (PRESERVISION AREDS) capsule, Take by mouth., Disp: , Rfl:   •  pantoprazole (PROTONIX) 40 MG EC tablet, Take 1 tablet by mouth Daily., Disp: 90 tablet, Rfl: 1  •  pravastatin (PRAVACHOL) 20 MG tablet, Take 1 tablet by mouth Daily., Disp: 90 tablet, Rfl: 1  •  rOPINIRole (REQUIP) 1 MG tablet, Take 1  "tablet by mouth Daily., Disp: 90 tablet, Rfl: 1.  he denies medication side effects.    All of the chronic condition(s) listed above are stable w/o issues.    BP 91/55  Pulse 73  Temp 97.3 °F (36.3 °C) (Oral)   Resp 16  Ht 69\" (175.3 cm)  Wt 126 lb (57.2 kg)  SpO2 98%  BMI 18.61 kg/m2    Results for orders placed or performed in visit on 08/03/17   TSH   Result Value Ref Range    TSH 3.640 0.270 - 4.200 mIU/mL   Iron Profile   Result Value Ref Range    TIBC 322 mcg/dL    UIBC 245 mcg/dL    Iron 77 59 - 158 mcg/dL    Iron Saturation 24 20 - 50 %   Vitamin B12   Result Value Ref Range    Vitamin B-12 984 (H) 211 - 946 pg/mL   CBC & Differential   Result Value Ref Range    WBC 8.03 4.50 - 10.70 10*3/mm3    RBC 3.57 (L) 4.60 - 6.00 10*6/mm3    Hemoglobin 11.2 (L) 13.7 - 17.6 g/dL    Hematocrit 35.8 (L) 40.4 - 52.2 %    .3 (H) 79.8 - 96.2 fL    MCH 31.4 27.0 - 32.7 pg    MCHC 31.3 (L) 32.6 - 36.4 g/dL    RDW 13.7 11.5 - 14.5 %    Platelets 204 140 - 500 10*3/mm3    Neutrophil Rel % 59.5 42.7 - 76.0 %    Lymphocyte Rel % 29.0 19.6 - 45.3 %    Monocyte Rel % 7.2 5.0 - 12.0 %    Eosinophil Rel % 3.7 0.3 - 6.2 %    Basophil Rel % 0.2 0.0 - 1.5 %    Neutrophils Absolute 4.77 1.90 - 8.10 10*3/mm3    Lymphocytes Absolute 2.33 0.90 - 4.80 10*3/mm3    Monocytes Absolute 0.58 0.20 - 1.20 10*3/mm3    Eosinophils Absolute 0.30 0.00 - 0.70 10*3/mm3    Basophils Absolute 0.02 0.00 - 0.20 10*3/mm3    Immature Granulocyte Rel % 0.4 0.0 - 0.5 %    Immature Grans Absolute 0.03 0.00 - 0.03 10*3/mm3         The following portions of the patient's history were reviewed and updated as appropriate: allergies, current medications, past family history, past medical history, past social history, past surgical history and problem list.    Review of Systems   Constitutional: Positive for fatigue. Negative for activity change, appetite change and unexpected weight change.   Eyes: Negative for visual disturbance.   Respiratory: Negative " for chest tightness and shortness of breath.    Cardiovascular: Negative for chest pain and palpitations.   Skin: Negative for color change.   Neurological: Negative for syncope and speech difficulty.   Psychiatric/Behavioral: Negative for confusion and decreased concentration.       Objective   Physical Exam   Constitutional: He is oriented to person, place, and time. He appears well-nourished.   HENT:   Head: Normocephalic.   Eyes: EOM are normal. Pupils are equal, round, and reactive to light.   Neck: Normal range of motion. Neck supple. No thyromegaly present.   Cardiovascular: Normal rate and regular rhythm.    Pulmonary/Chest: Effort normal and breath sounds normal.   Abdominal: Soft. Bowel sounds are normal.   Musculoskeletal:   Back pain   Neurological: He is alert and oriented to person, place, and time.   Psychiatric: He has a normal mood and affect. His behavior is normal.   Nursing note and vitals reviewed.      Assessment/Plan   Tye was seen today for hypothyroidism and fatigue.    Diagnoses and all orders for this visit:    Hypothyroidism, acquired  -     CBC & Differential; Future  -     Iron Profile; Future    Other fatigue  -     CBC & Differential; Future  -     Iron Profile; Future    Iron deficiency anemia, unspecified iron deficiency anemia type  -     CBC & Differential; Future  -     Iron Profile; Future

## 2017-09-05 DIAGNOSIS — I10 ESSENTIAL HYPERTENSION: ICD-10-CM

## 2017-09-05 DIAGNOSIS — G25.81 RLS (RESTLESS LEGS SYNDROME): ICD-10-CM

## 2017-09-05 DIAGNOSIS — K21.9 GASTROESOPHAGEAL REFLUX DISEASE, ESOPHAGITIS PRESENCE NOT SPECIFIED: ICD-10-CM

## 2017-09-05 DIAGNOSIS — K21.9 GASTROESOPHAGEAL REFLUX DISEASE WITHOUT ESOPHAGITIS: ICD-10-CM

## 2017-09-05 RX ORDER — PANTOPRAZOLE SODIUM 40 MG/1
TABLET, DELAYED RELEASE ORAL
Qty: 90 TABLET | Refills: 1 | OUTPATIENT
Start: 2017-09-05

## 2017-09-05 RX ORDER — PRAVASTATIN SODIUM 20 MG
TABLET ORAL
Qty: 90 TABLET | Refills: 1 | OUTPATIENT
Start: 2017-09-05

## 2017-09-05 RX ORDER — ROPINIROLE 1 MG/1
TABLET, FILM COATED ORAL
Qty: 90 TABLET | Refills: 1 | OUTPATIENT
Start: 2017-09-05

## 2017-09-05 RX ORDER — LISINOPRIL 2.5 MG/1
TABLET ORAL
Qty: 90 TABLET | Refills: 1 | OUTPATIENT
Start: 2017-09-05

## 2017-09-20 ENCOUNTER — OFFICE VISIT (OUTPATIENT)
Dept: FAMILY MEDICINE CLINIC | Facility: CLINIC | Age: 82
End: 2017-09-20

## 2017-09-20 VITALS
HEART RATE: 78 BPM | TEMPERATURE: 97.4 F | BODY MASS INDEX: 17.77 KG/M2 | RESPIRATION RATE: 16 BRPM | HEIGHT: 69 IN | WEIGHT: 120 LBS | OXYGEN SATURATION: 92 % | SYSTOLIC BLOOD PRESSURE: 110 MMHG | DIASTOLIC BLOOD PRESSURE: 62 MMHG

## 2017-09-20 DIAGNOSIS — G89.29 CHRONIC LOW BACK PAIN WITHOUT SCIATICA, UNSPECIFIED BACK PAIN LATERALITY: ICD-10-CM

## 2017-09-20 DIAGNOSIS — R53.1 WEAKNESS: ICD-10-CM

## 2017-09-20 DIAGNOSIS — M54.50 CHRONIC LOW BACK PAIN WITHOUT SCIATICA, UNSPECIFIED BACK PAIN LATERALITY: ICD-10-CM

## 2017-09-20 DIAGNOSIS — K29.70 GASTRITIS WITHOUT BLEEDING, UNSPECIFIED CHRONICITY, UNSPECIFIED GASTRITIS TYPE: Primary | ICD-10-CM

## 2017-09-20 PROCEDURE — 99214 OFFICE O/P EST MOD 30 MIN: CPT | Performed by: FAMILY MEDICINE

## 2017-09-20 NOTE — PROGRESS NOTES
Subjective   Tye Connor is a 84 y.o. male.     History of Present Illness   Chief Complaint:   Chief Complaint   Patient presents with   • Abdominal Pain   • Vomiting   • Nausea   • Anorexia   • Fatigue   • Weight Loss       Tye Connor 84 y.o. male who presents today for N/V, weakness, fatigue, abdominal pain, weight loss and no appetite that has been present for 3-4 weeks. He has lost 6 lb since his last appointment on 08/04/2017 epigastric abd pain   Mild but belching, eating less, saw dr JORDAN WITH SCOPE 1-2 YEARS AGO. VOMITING SOME, BURPING,  NO APPITITE, NAUSEA,  ENERGY LOW,   BACK PAIN     he has a problem list of   Patient Active Problem List   Diagnosis   • Back pain   • Hx of CABG   • Hyperlipidemia   • Hypothyroidism, acquired   • Kidney calculus   • Neuropathy   • History of carotid endarterectomy   • Essential hypertension   • Acid reflux   • Gastric ulcer   • Compression fracture of thoracic vertebra   • Hypertrophic polyarthritis   • Gait instability   • Memory impairment   • Pulmonary fibrosis   .  Since the last visit, he has overall felt well.  he has been compliant with   Current Outpatient Prescriptions:   •  calcium citrate-vitamin d (CALCITRATE) 315-250 MG-UNIT tablet tablet, Take  by mouth daily., Disp: , Rfl:   •  cyanocobalamin 100 MCG tablet, Take by mouth., Disp: , Rfl:   •  Diphenhydramine-APAP, sleep, (ACETAMINOPHEN PM PO), Take  by mouth As Needed., Disp: , Rfl:   •  donepezil (ARICEPT) 10 MG tablet, , Disp: , Rfl:   •  gabapentin (NEURONTIN) 300 MG capsule, Take 1 capsule by mouth 2 (Two) Times a Day., Disp: 180 capsule, Rfl: 1  •  Ginkgo Biloba 100 MG capsule, Take  by mouth., Disp: , Rfl:   •  levothyroxine (SYNTHROID, LEVOTHROID) 75 MCG tablet, Take 1 tablet by mouth Daily., Disp: 90 tablet, Rfl: 0  •  lisinopril (PRINIVIL,ZESTRIL) 2.5 MG tablet, Take 1 tablet by mouth Daily., Disp: 90 tablet, Rfl: 1  •  Multiple Vitamins-Minerals (PRESERVISION AREDS) capsule, Take by  "mouth., Disp: , Rfl:   •  pantoprazole (PROTONIX) 40 MG EC tablet, Take 1 tablet by mouth Daily., Disp: 90 tablet, Rfl: 1  •  pravastatin (PRAVACHOL) 20 MG tablet, Take 1 tablet by mouth Daily., Disp: 90 tablet, Rfl: 1  •  rOPINIRole (REQUIP) 1 MG tablet, Take 1 tablet by mouth Daily., Disp: 90 tablet, Rfl: 1.  he denies medication side effects.    All of the chronic condition(s) listed above are stable w/o issues.    /62  Pulse 78  Temp 97.4 °F (36.3 °C) (Oral)   Resp 16  Ht 69\" (175.3 cm)  Wt 120 lb (54.4 kg)  SpO2 92%  BMI 17.72 kg/m2    Results for orders placed or performed in visit on 08/03/17   TSH   Result Value Ref Range    TSH 3.640 0.270 - 4.200 mIU/mL   Iron Profile   Result Value Ref Range    TIBC 322 mcg/dL    UIBC 245 mcg/dL    Iron 77 59 - 158 mcg/dL    Iron Saturation 24 20 - 50 %   Vitamin B12   Result Value Ref Range    Vitamin B-12 984 (H) 211 - 946 pg/mL   CBC & Differential   Result Value Ref Range    WBC 8.03 4.50 - 10.70 10*3/mm3    RBC 3.57 (L) 4.60 - 6.00 10*6/mm3    Hemoglobin 11.2 (L) 13.7 - 17.6 g/dL    Hematocrit 35.8 (L) 40.4 - 52.2 %    .3 (H) 79.8 - 96.2 fL    MCH 31.4 27.0 - 32.7 pg    MCHC 31.3 (L) 32.6 - 36.4 g/dL    RDW 13.7 11.5 - 14.5 %    Platelets 204 140 - 500 10*3/mm3    Neutrophil Rel % 59.5 42.7 - 76.0 %    Lymphocyte Rel % 29.0 19.6 - 45.3 %    Monocyte Rel % 7.2 5.0 - 12.0 %    Eosinophil Rel % 3.7 0.3 - 6.2 %    Basophil Rel % 0.2 0.0 - 1.5 %    Neutrophils Absolute 4.77 1.90 - 8.10 10*3/mm3    Lymphocytes Absolute 2.33 0.90 - 4.80 10*3/mm3    Monocytes Absolute 0.58 0.20 - 1.20 10*3/mm3    Eosinophils Absolute 0.30 0.00 - 0.70 10*3/mm3    Basophils Absolute 0.02 0.00 - 0.20 10*3/mm3    Immature Granulocyte Rel % 0.4 0.0 - 0.5 %    Immature Grans Absolute 0.03 0.00 - 0.03 10*3/mm3         The following portions of the patient's history were reviewed and updated as appropriate: allergies, current medications, past family history, past medical " history, past social history, past surgical history and problem list.    Review of Systems   Constitutional: Positive for appetite change and unexpected weight change. Negative for activity change.   Eyes: Negative for visual disturbance.   Respiratory: Negative for chest tightness and shortness of breath.    Cardiovascular: Negative for chest pain and palpitations.   Gastrointestinal: Positive for abdominal pain, nausea and vomiting.   Skin: Negative for color change.   Neurological: Positive for weakness. Negative for syncope and speech difficulty.   Psychiatric/Behavioral: Negative for confusion and decreased concentration.       Objective   Physical Exam   Constitutional: He appears well-developed and well-nourished.   HENT:   Mouth/Throat: Oropharynx is clear and moist.   Eyes: Pupils are equal, round, and reactive to light.   Neck: Neck supple.   Cardiovascular: Normal rate and regular rhythm.    Pulmonary/Chest: Effort normal and breath sounds normal.   Abdominal: Soft. He exhibits no mass. There is no tenderness.   SLIGHT TENDERNESS EPIGASTRIC AREA   Musculoskeletal: Normal range of motion.   Neurological: He is alert.   Psychiatric: He has a normal mood and affect. His behavior is normal.   Nursing note and vitals reviewed.      Assessment/Plan   Tye was seen today for abdominal pain, vomiting, nausea, anorexia, fatigue and weight loss.    Diagnoses and all orders for this visit:    Gastritis without bleeding, unspecified chronicity, unspecified gastritis type  -     CBC & Differential  -     Comprehensive Metabolic Panel  -     Amylase  -     Lipase  -     Urinalysis With Microscopic    Weakness  -     CBC & Differential  -     Comprehensive Metabolic Panel  -     Amylase  -     Lipase  -     Urinalysis With Microscopic    Chronic low back pain without sciatica, unspecified back pain laterality  -     CBC & Differential  -     Comprehensive Metabolic Panel  -     Amylase  -     Lipase  -     Urinalysis  With Microscopic

## 2017-09-21 LAB
ALBUMIN SERPL-MCNC: 3.8 G/DL (ref 3.5–5.2)
ALBUMIN/GLOB SERPL: 1.1 G/DL
ALP SERPL-CCNC: 58 U/L (ref 39–117)
ALT SERPL-CCNC: 7 U/L (ref 1–41)
AMYLASE SERPL-CCNC: 121 U/L (ref 28–100)
APPEARANCE UR: CLEAR
AST SERPL-CCNC: 19 U/L (ref 1–40)
BACTERIA #/AREA URNS HPF: ABNORMAL /HPF
BASOPHILS # BLD AUTO: 0.02 10*3/MM3 (ref 0–0.2)
BASOPHILS NFR BLD AUTO: 0.3 % (ref 0–1.5)
BILIRUB SERPL-MCNC: 1 MG/DL (ref 0.1–1.2)
BILIRUB UR QL STRIP: NEGATIVE
BUN SERPL-MCNC: 22 MG/DL (ref 8–23)
BUN/CREAT SERPL: 17.7 (ref 7–25)
CALCIUM SERPL-MCNC: 9.4 MG/DL (ref 8.6–10.5)
CASTS URNS MICRO: ABNORMAL
CHLORIDE SERPL-SCNC: 99 MMOL/L (ref 98–107)
CO2 SERPL-SCNC: 25.9 MMOL/L (ref 22–29)
COLOR UR: YELLOW
CREAT SERPL-MCNC: 1.24 MG/DL (ref 0.76–1.27)
EOSINOPHIL # BLD AUTO: 0.22 10*3/MM3 (ref 0–0.7)
EOSINOPHIL NFR BLD AUTO: 2.8 % (ref 0.3–6.2)
EPI CELLS #/AREA URNS HPF: ABNORMAL /HPF
ERYTHROCYTE [DISTWIDTH] IN BLOOD BY AUTOMATED COUNT: 13.5 % (ref 11.5–14.5)
GLOBULIN SER CALC-MCNC: 3.6 GM/DL
GLUCOSE SERPL-MCNC: 101 MG/DL (ref 65–99)
GLUCOSE UR QL: NEGATIVE
HCT VFR BLD AUTO: 34 % (ref 40.4–52.2)
HGB BLD-MCNC: 10.8 G/DL (ref 13.7–17.6)
HGB UR QL STRIP: (no result)
IMM GRANULOCYTES # BLD: 0.02 10*3/MM3 (ref 0–0.03)
IMM GRANULOCYTES NFR BLD: 0.3 % (ref 0–0.5)
KETONES UR QL STRIP: NEGATIVE
LEUKOCYTE ESTERASE UR QL STRIP: NEGATIVE
LIPASE SERPL-CCNC: 45 U/L (ref 13–60)
LYMPHOCYTES # BLD AUTO: 2.24 10*3/MM3 (ref 0.9–4.8)
LYMPHOCYTES NFR BLD AUTO: 28.7 % (ref 19.6–45.3)
MCH RBC QN AUTO: 31.6 PG (ref 27–32.7)
MCHC RBC AUTO-ENTMCNC: 31.8 G/DL (ref 32.6–36.4)
MCV RBC AUTO: 99.4 FL (ref 79.8–96.2)
MONOCYTES # BLD AUTO: 0.78 10*3/MM3 (ref 0.2–1.2)
MONOCYTES NFR BLD AUTO: 10 % (ref 5–12)
NEUTROPHILS # BLD AUTO: 4.52 10*3/MM3 (ref 1.9–8.1)
NEUTROPHILS NFR BLD AUTO: 57.9 % (ref 42.7–76)
NITRITE UR QL STRIP: NEGATIVE
PH UR STRIP: 6 [PH] (ref 5–8)
PLATELET # BLD AUTO: 203 10*3/MM3 (ref 140–500)
POTASSIUM SERPL-SCNC: 4.8 MMOL/L (ref 3.5–5.2)
PROT SERPL-MCNC: 7.4 G/DL (ref 6–8.5)
PROT UR QL STRIP: NEGATIVE
RBC # BLD AUTO: 3.42 10*6/MM3 (ref 4.6–6)
RBC #/AREA URNS HPF: ABNORMAL /HPF
SODIUM SERPL-SCNC: 136 MMOL/L (ref 136–145)
SP GR UR: 1.02 (ref 1–1.03)
UROBILINOGEN UR STRIP-MCNC: (no result) MG/DL
WBC # BLD AUTO: 7.8 10*3/MM3 (ref 4.5–10.7)
WBC #/AREA URNS HPF: ABNORMAL /HPF

## 2017-09-25 ENCOUNTER — OFFICE VISIT (OUTPATIENT)
Dept: FAMILY MEDICINE CLINIC | Facility: CLINIC | Age: 82
End: 2017-09-25

## 2017-09-25 VITALS
SYSTOLIC BLOOD PRESSURE: 110 MMHG | OXYGEN SATURATION: 92 % | HEIGHT: 69 IN | TEMPERATURE: 97.5 F | BODY MASS INDEX: 17.77 KG/M2 | HEART RATE: 81 BPM | RESPIRATION RATE: 16 BRPM | DIASTOLIC BLOOD PRESSURE: 68 MMHG | WEIGHT: 120 LBS

## 2017-09-25 DIAGNOSIS — R63.4 WEIGHT LOSS: ICD-10-CM

## 2017-09-25 DIAGNOSIS — K29.70 GASTRITIS WITHOUT BLEEDING, UNSPECIFIED CHRONICITY, UNSPECIFIED GASTRITIS TYPE: ICD-10-CM

## 2017-09-25 DIAGNOSIS — R11.2 NAUSEA AND VOMITING, INTRACTABILITY OF VOMITING NOT SPECIFIED, UNSPECIFIED VOMITING TYPE: ICD-10-CM

## 2017-09-25 DIAGNOSIS — R10.13 EPIGASTRIC PAIN: Primary | ICD-10-CM

## 2017-09-25 DIAGNOSIS — R63.0 DECREASED APPETITE: ICD-10-CM

## 2017-09-25 PROCEDURE — 99214 OFFICE O/P EST MOD 30 MIN: CPT | Performed by: FAMILY MEDICINE

## 2017-09-25 NOTE — PROGRESS NOTES
Subjective   Tye Connor is a 84 y.o. male.     History of Present Illness   Chief Complaint:   Chief Complaint   Patient presents with   • Abdominal Pain     follow up   • Vomiting   • Nausea   • Weight Loss       Tye Connor 84 y.o. male who presents today to follow up for N/V, weakness, fatigue, epigastric abdominal pain, weight loss and no appetite. He has not lost any weight since his appointment 5 days ago. I reviewed his lab results.   Hb  11.2 and 10.8    Amylase 121  he has a problem list of   Patient Active Problem List   Diagnosis   • Back pain   • Hx of CABG   • Hyperlipidemia   • Hypothyroidism, acquired   • Kidney calculus   • Neuropathy   • History of carotid endarterectomy   • Essential hypertension   • Acid reflux   • Gastric ulcer   • Compression fracture of thoracic vertebra   • Hypertrophic polyarthritis   • Gait instability   • Memory impairment   • Pulmonary fibrosis   .  Since the last visit, he has overall felt well.  he has been compliant with   Current Outpatient Prescriptions:   •  calcium citrate-vitamin d (CALCITRATE) 315-250 MG-UNIT tablet tablet, Take  by mouth daily., Disp: , Rfl:   •  cyanocobalamin 100 MCG tablet, Take by mouth., Disp: , Rfl:   •  Diphenhydramine-APAP, sleep, (ACETAMINOPHEN PM PO), Take  by mouth As Needed., Disp: , Rfl:   •  donepezil (ARICEPT) 10 MG tablet, , Disp: , Rfl:   •  gabapentin (NEURONTIN) 300 MG capsule, Take 1 capsule by mouth 2 (Two) Times a Day., Disp: 180 capsule, Rfl: 1  •  Ginkgo Biloba 100 MG capsule, Take  by mouth., Disp: , Rfl:   •  levothyroxine (SYNTHROID, LEVOTHROID) 75 MCG tablet, Take 1 tablet by mouth Daily., Disp: 90 tablet, Rfl: 0  •  lisinopril (PRINIVIL,ZESTRIL) 2.5 MG tablet, Take 1 tablet by mouth Daily., Disp: 90 tablet, Rfl: 1  •  Multiple Vitamins-Minerals (PRESERVISION AREDS) capsule, Take by mouth., Disp: , Rfl:   •  pantoprazole (PROTONIX) 40 MG EC tablet, Take 1 tablet by mouth Daily., Disp: 90 tablet, Rfl: 1  •   "pravastatin (PRAVACHOL) 20 MG tablet, Take 1 tablet by mouth Daily., Disp: 90 tablet, Rfl: 1  •  rOPINIRole (REQUIP) 1 MG tablet, Take 1 tablet by mouth Daily., Disp: 90 tablet, Rfl: 1.  he denies medication side effects.    All of the chronic condition(s) listed above are stable w/o issues.    /68  Pulse 81  Temp 97.5 °F (36.4 °C) (Oral)   Resp 16  Ht 69\" (175.3 cm)  Wt 120 lb (54.4 kg)  SpO2 92%  BMI 17.72 kg/m2    Results for orders placed or performed in visit on 09/20/17   Comprehensive Metabolic Panel   Result Value Ref Range    Glucose 101 (H) 65 - 99 mg/dL    BUN 22 8 - 23 mg/dL    Creatinine 1.24 0.76 - 1.27 mg/dL    eGFR Non African Am 56 (L) >60 mL/min/1.73    eGFR African Am 67 >60 mL/min/1.73    BUN/Creatinine Ratio 17.7 7.0 - 25.0    Sodium 136 136 - 145 mmol/L    Potassium 4.8 3.5 - 5.2 mmol/L    Chloride 99 98 - 107 mmol/L    Total CO2 25.9 22.0 - 29.0 mmol/L    Calcium 9.4 8.6 - 10.5 mg/dL    Total Protein 7.4 6.0 - 8.5 g/dL    Albumin 3.80 3.50 - 5.20 g/dL    Globulin 3.6 gm/dL    A/G Ratio 1.1 g/dL    Total Bilirubin 1.0 0.1 - 1.2 mg/dL    Alkaline Phosphatase 58 39 - 117 U/L    AST (SGOT) 19 1 - 40 U/L    ALT (SGPT) 7 1 - 41 U/L   Amylase   Result Value Ref Range    Amylase 121 (H) 28 - 100 U/L   Lipase   Result Value Ref Range    Lipase 45 13 - 60 U/L   Microscopic Examination   Result Value Ref Range    WBC, UA 0-2 /hpf    RBC, UA 3-5 (A) /hpf    Epithelial Cells (non renal) 0-2 /hpf    Cast Type Comment     Bacteria, UA Comment None Seen /hpf   CBC & Differential   Result Value Ref Range    WBC 7.80 4.50 - 10.70 10*3/mm3    RBC 3.42 (L) 4.60 - 6.00 10*6/mm3    Hemoglobin 10.8 (L) 13.7 - 17.6 g/dL    Hematocrit 34.0 (L) 40.4 - 52.2 %    MCV 99.4 (H) 79.8 - 96.2 fL    MCH 31.6 27.0 - 32.7 pg    MCHC 31.8 (L) 32.6 - 36.4 g/dL    RDW 13.5 11.5 - 14.5 %    Platelets 203 140 - 500 10*3/mm3    Neutrophil Rel % 57.9 42.7 - 76.0 %    Lymphocyte Rel % 28.7 19.6 - 45.3 %    Monocyte Rel % " 10.0 5.0 - 12.0 %    Eosinophil Rel % 2.8 0.3 - 6.2 %    Basophil Rel % 0.3 0.0 - 1.5 %    Neutrophils Absolute 4.52 1.90 - 8.10 10*3/mm3    Lymphocytes Absolute 2.24 0.90 - 4.80 10*3/mm3    Monocytes Absolute 0.78 0.20 - 1.20 10*3/mm3    Eosinophils Absolute 0.22 0.00 - 0.70 10*3/mm3    Basophils Absolute 0.02 0.00 - 0.20 10*3/mm3    Immature Granulocyte Rel % 0.3 0.0 - 0.5 %    Immature Grans Absolute 0.02 0.00 - 0.03 10*3/mm3   Urinalysis With Microscopic   Result Value Ref Range    Specific Gravity, UA 1.018 1.005 - 1.030    pH, UA 6.0 5.0 - 8.0    Color, UA Yellow     Appearance, UA Clear Clear    Leukocytes, UA Negative Negative    Protein Negative Negative    Glucose, UA Negative Negative    Ketones Negative Negative    Blood, UA Trace (A) Negative    Bilirubin, UA Negative Negative    Urobilinogen, UA Comment     Nitrite, UA Negative Negative         The following portions of the patient's history were reviewed and updated as appropriate: allergies, current medications, past family history, past medical history, past social history, past surgical history and problem list.    Review of Systems   Constitutional: Positive for appetite change and unexpected weight change. Negative for activity change.   Respiratory: Negative for chest tightness and shortness of breath.    Cardiovascular: Negative for chest pain and palpitations.   Gastrointestinal: Positive for abdominal pain, nausea and vomiting.   Skin: Negative for color change.   Neurological: Negative for syncope and speech difficulty.   Psychiatric/Behavioral: Negative for confusion and decreased concentration.       Objective   Physical Exam   Constitutional: He is oriented to person, place, and time. He appears well-developed and well-nourished.   HENT:   Head: Normocephalic.   Eyes: Pupils are equal, round, and reactive to light.   Neck: Normal range of motion. Neck supple. No thyromegaly present.   Cardiovascular: Normal rate and regular rhythm.     Pulmonary/Chest: Effort normal and breath sounds normal.   Abdominal: Soft. Bowel sounds are normal. He exhibits no distension and no mass. There is tenderness. There is no guarding.   Musculoskeletal: Normal range of motion.   Lymphadenopathy:     He has no cervical adenopathy.   Neurological: He is alert and oriented to person, place, and time.   Skin: Skin is warm and dry.   Psychiatric: He has a normal mood and affect. His behavior is normal.   Nursing note and vitals reviewed.      Assessment/Plan   Tye was seen today for abdominal pain, vomiting, nausea and weight loss.    Diagnoses and all orders for this visit:    Epigastric pain  -     H. Pylori IgM, Blood    Nausea and vomiting, intractability of vomiting not specified, unspecified vomiting type  -     H. Pylori IgM, Blood    Weight loss  -     H. Pylori IgM, Blood    Decreased appetite  -     H. Pylori IgM, Blood    Gastritis without bleeding, unspecified chronicity, unspecified gastritis type

## 2017-09-25 NOTE — PATIENT INSTRUCTIONS
Exercise 30 minutes most days of the week  Sleep 6-8 hours each night if possible  Low fat, low cholesterol diet   we discussed prescribed medications and how to take them   make sure you get results of any labs/studies ordered today  Low glycemic index diet     See dr shay as planned

## 2017-09-27 LAB — H PYLORI IGM SER-ACNC: <9 UNITS (ref 0–8.9)

## 2017-10-02 ENCOUNTER — OFFICE VISIT (OUTPATIENT)
Dept: GASTROENTEROLOGY | Facility: CLINIC | Age: 82
End: 2017-10-02

## 2017-10-02 VITALS
HEIGHT: 69 IN | DIASTOLIC BLOOD PRESSURE: 66 MMHG | TEMPERATURE: 97.8 F | WEIGHT: 117.6 LBS | SYSTOLIC BLOOD PRESSURE: 108 MMHG | BODY MASS INDEX: 17.42 KG/M2

## 2017-10-02 DIAGNOSIS — R68.81 EARLY SATIETY: ICD-10-CM

## 2017-10-02 DIAGNOSIS — R63.4 WEIGHT LOSS: Primary | ICD-10-CM

## 2017-10-02 DIAGNOSIS — D50.8 OTHER IRON DEFICIENCY ANEMIA: ICD-10-CM

## 2017-10-02 DIAGNOSIS — R10.13 EPIGASTRIC PAIN: ICD-10-CM

## 2017-10-02 PROBLEM — D64.9 ABSOLUTE ANEMIA: Status: ACTIVE | Noted: 2017-10-02

## 2017-10-02 PROCEDURE — 99213 OFFICE O/P EST LOW 20 MIN: CPT | Performed by: INTERNAL MEDICINE

## 2017-10-02 NOTE — PROGRESS NOTES
Chief Complaint   Patient presents with   • GI Problem   • Abdominal Pain       Tye Connor is a  84 y.o. male here for a follow up visit for Abdominal pain, weight loss, new onset anemia    GI Problem   The primary symptoms include fatigue and abdominal pain. Primary symptoms do not include nausea, vomiting, diarrhea or hematemesis. The illness began more than 7 days ago. The onset was gradual. The problem has been gradually worsening.   The fatigue began more than 1 week ago. The fatigue has been worsening since its onset.   The abdominal pain began more than 2 days ago. The abdominal pain has been gradually worsening since its onset. The abdominal pain is located in the LLQ, RLQ and epigastric region. The abdominal pain does not radiate. The severity of the abdominal pain is 4/10. The abdominal pain is relieved by nothing.   The illness is also significant for bloating, back pain and itching. The illness does not include chills, dysphagia, odynophagia or tenesmus. Associated medical issues do not include inflammatory bowel disease, liver disease, alcohol abuse, gastric bypass, bowel resection, hemorrhoids or diverticulitis.       Past Medical History:   Diagnosis Date   • Back pain    • GERD (gastroesophageal reflux disease)    • H/O CT scan of abdomen 05/31/2016    interstitial fibrosis, r renal cyst   • History of carotid endarterectomy     left   • Hx of CABG    • Hyperlipidemia    • Hypertension    • Hypothyroid    • Hypothyroidism, acquired    • Kidney calculus    • Macular degeneration    • Neuropathy    • Peripheral neuropathy    • Pulmonary fibrosis    • Screening for glaucoma 08/15/2012    Dr. Thompson; macular degeneration   • Stomach pain    • Stomach ulcer        Past Surgical History:   Procedure Laterality Date   • CAROTID ENDARTERECTOMY  2003   • CATARACT EXTRACTION Right 03/14/2012    Dr. Elder   • COLONOSCOPY     • CORONARY ARTERY BYPASS GRAFT  10/1994   • ENDOSCOPY N/A 6/2/2016    Gastritis     • ENDOSCOPY  08/07/2014    erosive gastritis, duodenitis.       Scheduled Meds:    Continuous Infusions:  No current facility-administered medications for this visit.     PRN Meds:.    Allergies   Allergen Reactions   • Iodinated Diagnostic Agents Shortness Of Breath       Social History     Social History   • Marital status:      Spouse name: N/A   • Number of children: N/A   • Years of education: N/A     Occupational History   • Not on file.     Social History Main Topics   • Smoking status: Former Smoker   • Smokeless tobacco: Never Used   • Alcohol use No   • Drug use: No   • Sexual activity: Defer     Other Topics Concern   • Not on file     Social History Narrative       Family History   Problem Relation Age of Onset   • Cancer Brother      prostate   • Heart disease Brother    • Pancreatic cancer Brother    • Coronary artery disease Other    • Heart disease Mother    • Cancer Father    • No Known Problems Maternal Grandmother    • No Known Problems Maternal Grandfather    • No Known Problems Paternal Grandmother    • No Known Problems Paternal Grandfather        Review of Systems   Constitutional: Positive for fatigue. Negative for chills.   Gastrointestinal: Positive for abdominal distention, abdominal pain and bloating. Negative for anal bleeding, blood in stool, diarrhea, dysphagia, hematemesis, nausea, rectal pain and vomiting.   Musculoskeletal: Positive for back pain.   Skin: Positive for itching.   All other systems reviewed and are negative.      Vitals:    10/02/17 1525   BP: 108/66   Temp: 97.8 °F (36.6 °C)       Physical Exam   Constitutional: He is oriented to person, place, and time. He appears well-developed and well-nourished.   HENT:   Head: Normocephalic and atraumatic.   Eyes: Conjunctivae and EOM are normal.   Neck: Normal range of motion. No tracheal deviation present.   Cardiovascular: Normal rate and regular rhythm.    Pulmonary/Chest: Effort normal and breath sounds normal. No  respiratory distress.   Abdominal: Soft. Bowel sounds are normal. He exhibits no distension and no mass. There is no tenderness. There is no rebound and no guarding.   Musculoskeletal: Normal range of motion.   Neurological: He is alert and oriented to person, place, and time.   Skin: Skin is warm and dry.   Psychiatric: He has a normal mood and affect. Judgment normal.   Nursing note and vitals reviewed.      No images are attached to the encounter.    Problem list    Abdominal pain epigastric region  HIDA scan and ultrasound normal last year  Abdominal pain lower quadrants  Weight loss 10 pounds in one month  Anemia, drop of 2 g in hemoglobin over the last 6 months  EGD with gastritis 4 years ago  EGD normal last year  Colonoscopy normal 5 years ago      Assessment/Plan    Based on the above issues we will plan for EGD and colonoscopy    An EGD and  colonoscopy will be scheduled by my staff, the instructions will either be handed to you or mailed to you.  You'll receive an appointment date and time.  You will need to bring a  with you on that day to drive you home.    A CAT scan without contrast year ago of the abdomen was normal, if the above testing is within normal limits I will plan for a contrasted, pancreas protocol CT scan

## 2017-10-03 ENCOUNTER — TELEPHONE (OUTPATIENT)
Dept: GASTROENTEROLOGY | Facility: CLINIC | Age: 82
End: 2017-10-03

## 2017-10-03 NOTE — TELEPHONE ENCOUNTER
----- Message from Kate Ritchie sent at 10/3/2017 12:55 PM EDT -----  Regarding: PT CALLED  Contact: [phone]   PT IS CALLING, HE JUST LEFT THE OFFICE YESTERDAY STATED  WANTED HIM TO HAVE SCOPE DONE IN THE NEXT 2 WEEKS. PT IS CHARANJ[facility] FOR SCOPE OCT 31ST, STATED HE WAS LOSING BLOOD IS WHY HE WANTED TO HAVE IT DONE SOONER

## 2017-10-12 ENCOUNTER — ANESTHESIA EVENT (OUTPATIENT)
Dept: GASTROENTEROLOGY | Facility: HOSPITAL | Age: 82
End: 2017-10-12

## 2017-10-12 ENCOUNTER — HOSPITAL ENCOUNTER (OUTPATIENT)
Facility: HOSPITAL | Age: 82
Setting detail: HOSPITAL OUTPATIENT SURGERY
Discharge: HOME | End: 2017-10-12
Attending: INTERNAL MEDICINE | Admitting: INTERNAL MEDICINE

## 2017-10-12 ENCOUNTER — ANESTHESIA (OUTPATIENT)
Dept: GASTROENTEROLOGY | Facility: HOSPITAL | Age: 82
End: 2017-10-12

## 2017-10-12 VITALS
DIASTOLIC BLOOD PRESSURE: 97 MMHG | BODY MASS INDEX: 17.2 KG/M2 | SYSTOLIC BLOOD PRESSURE: 109 MMHG | RESPIRATION RATE: 16 BRPM | WEIGHT: 116.1 LBS | TEMPERATURE: 97.4 F | HEART RATE: 66 BPM | HEIGHT: 69 IN | OXYGEN SATURATION: 100 %

## 2017-10-12 DIAGNOSIS — R68.81 EARLY SATIETY: ICD-10-CM

## 2017-10-12 DIAGNOSIS — R10.13 EPIGASTRIC PAIN: ICD-10-CM

## 2017-10-12 DIAGNOSIS — D50.8 OTHER IRON DEFICIENCY ANEMIA: ICD-10-CM

## 2017-10-12 DIAGNOSIS — R63.4 WEIGHT LOSS: ICD-10-CM

## 2017-10-12 PROCEDURE — 88312 SPECIAL STAINS GROUP 1: CPT | Performed by: INTERNAL MEDICINE

## 2017-10-12 PROCEDURE — 45380 COLONOSCOPY AND BIOPSY: CPT | Performed by: INTERNAL MEDICINE

## 2017-10-12 PROCEDURE — 43239 EGD BIOPSY SINGLE/MULTIPLE: CPT | Performed by: INTERNAL MEDICINE

## 2017-10-12 PROCEDURE — 88305 TISSUE EXAM BY PATHOLOGIST: CPT | Performed by: INTERNAL MEDICINE

## 2017-10-12 PROCEDURE — 25010000002 PROPOFOL 10 MG/ML EMULSION: Performed by: ANESTHESIOLOGY

## 2017-10-12 RX ORDER — SODIUM CHLORIDE, SODIUM LACTATE, POTASSIUM CHLORIDE, CALCIUM CHLORIDE 600; 310; 30; 20 MG/100ML; MG/100ML; MG/100ML; MG/100ML
30 INJECTION, SOLUTION INTRAVENOUS CONTINUOUS PRN
Status: DISCONTINUED | OUTPATIENT
Start: 2017-10-12 | End: 2017-10-12 | Stop reason: HOSPADM

## 2017-10-12 RX ORDER — LIDOCAINE HYDROCHLORIDE 20 MG/ML
INJECTION, SOLUTION INFILTRATION; PERINEURAL AS NEEDED
Status: DISCONTINUED | OUTPATIENT
Start: 2017-10-12 | End: 2017-10-12 | Stop reason: SURG

## 2017-10-12 RX ORDER — PROPOFOL 10 MG/ML
VIAL (ML) INTRAVENOUS AS NEEDED
Status: DISCONTINUED | OUTPATIENT
Start: 2017-10-12 | End: 2017-10-12 | Stop reason: SURG

## 2017-10-12 RX ORDER — PROPOFOL 10 MG/ML
VIAL (ML) INTRAVENOUS CONTINUOUS PRN
Status: DISCONTINUED | OUTPATIENT
Start: 2017-10-12 | End: 2017-10-12 | Stop reason: SURG

## 2017-10-12 RX ADMIN — PROPOFOL 50 MG: 10 INJECTION, EMULSION INTRAVENOUS at 14:45

## 2017-10-12 RX ADMIN — SODIUM CHLORIDE, POTASSIUM CHLORIDE, SODIUM LACTATE AND CALCIUM CHLORIDE 30 ML/HR: 600; 310; 30; 20 INJECTION, SOLUTION INTRAVENOUS at 13:56

## 2017-10-12 RX ADMIN — SODIUM CHLORIDE, POTASSIUM CHLORIDE, SODIUM LACTATE AND CALCIUM CHLORIDE: 600; 310; 30; 20 INJECTION, SOLUTION INTRAVENOUS at 14:35

## 2017-10-12 RX ADMIN — PROPOFOL 100 MCG/KG/MIN: 10 INJECTION, EMULSION INTRAVENOUS at 14:46

## 2017-10-12 RX ADMIN — LIDOCAINE HYDROCHLORIDE 60 MG: 20 INJECTION, SOLUTION INFILTRATION; PERINEURAL at 14:46

## 2017-10-12 NOTE — ANESTHESIA PREPROCEDURE EVALUATION
Anesthesia Evaluation     Patient summary reviewed and Nursing notes reviewed   NPO Solid Status: > 8 hours  NPO Liquid Status: > 8 hours     Airway   Mallampati: I  TM distance: <3 FB  Neck ROM: full  no difficulty expected  Dental - normal exam     Pulmonary - negative pulmonary ROS and normal exam   Cardiovascular - normal exam  Exercise tolerance: poor (<4 METS)    (+) hypertension, CABG, hyperlipidemia      Neuro/Psych  (+) numbness,    GI/Hepatic/Renal/Endo    (+)  GERD, hypothyroidism,     Musculoskeletal     (+) back pain,   Abdominal  - normal exam    Bowel sounds: normal.   Substance History - negative use     OB/GYN negative ob/gyn ROS         Other   (+) arthritis                                   Anesthesia Plan    ASA 3     MAC     Anesthetic plan and risks discussed with patient.

## 2017-10-12 NOTE — ANESTHESIA POSTPROCEDURE EVALUATION
"Patient: Tye Connor    Procedure Summary     Date Anesthesia Start Anesthesia Stop Room / Location    10/12/17 1437 1516  [facility]       Procedure Diagnosis Surgeon Provider    ESOPHAGOGASTRODUODENOSCOPY WITH BIOPSIES (N/A Esophagus); COLONOSCOPY TO CECUM AND TERMINAL ILEUM WITH COLD BIOPSY POLYPECTOMY (N/A ) Early satiety; Epigastric pain; Weight loss; Other iron deficiency anemia  (Early satiety [R68.81]; Epigastric pain [R10.13]; Weight loss [R63.4]; Other iron deficiency anemia [D50.8]) MD Hossein Gonzalez MD          Anesthesia Type: MAC  Last vitals  BP   109/97 (10/12/17 1535)    Temp   36.3 °C (97.4 °F) (10/12/17 1516)    Pulse   66 (10/12/17 1535)   Resp   16 (10/12/17 1535)    SpO2   100 % (10/12/17 1535)      Post Anesthesia Care and Evaluation    Patient location during evaluation: PACU  Patient participation: complete - patient participated  Level of consciousness: awake  Pain score: 0  Pain management: adequate  Airway patency: patent  Anesthetic complications: No anesthetic complications  PONV Status: none  Cardiovascular status: acceptable  Respiratory status: acceptable  Hydration status: acceptable    Comments: /97 (BP Location: Left arm, Patient Position: Lying)  Pulse 66  Temp 36.3 °C (97.4 °F) (Oral)   Resp 16  Ht 69\" (175.3 cm)  Wt 116 lb 1.6 oz (52.7 kg)  SpO2 100%  BMI 17.14 kg/m2      "

## 2017-10-13 LAB
CYTO UR: NORMAL
LAB AP CASE REPORT: NORMAL
Lab: NORMAL
PATH REPORT.FINAL DX SPEC: NORMAL
PATH REPORT.GROSS SPEC: NORMAL

## 2017-10-18 ENCOUNTER — TELEPHONE (OUTPATIENT)
Dept: GASTROENTEROLOGY | Facility: CLINIC | Age: 82
End: 2017-10-18

## 2017-10-18 NOTE — TELEPHONE ENCOUNTER
----- Message from Rony Wolfe MD sent at 10/17/2017  5:32 PM EDT -----  Tubular adenoma, colonoscopy recall 5 years

## 2017-10-18 NOTE — TELEPHONE ENCOUNTER
Patient called, advised of Dr. Wolfe's note. He verb understanding. Patient's health maintenance record was updated to reflect the need to repeat his colonoscopy in 5 years.

## 2017-10-19 DIAGNOSIS — G62.9 NEUROPATHY: ICD-10-CM

## 2017-10-20 RX ORDER — GABAPENTIN 300 MG/1
CAPSULE ORAL
Qty: 180 CAPSULE | Refills: 0 | OUTPATIENT
Start: 2017-10-20

## 2017-10-23 DIAGNOSIS — K21.9 GASTROESOPHAGEAL REFLUX DISEASE WITHOUT ESOPHAGITIS: ICD-10-CM

## 2017-10-23 DIAGNOSIS — G25.81 RLS (RESTLESS LEGS SYNDROME): ICD-10-CM

## 2017-10-23 RX ORDER — PANTOPRAZOLE SODIUM 40 MG/1
TABLET, DELAYED RELEASE ORAL
Qty: 90 TABLET | Refills: 0 | Status: SHIPPED | OUTPATIENT
Start: 2017-10-23 | End: 2018-02-06 | Stop reason: SDUPTHER

## 2017-10-23 RX ORDER — ROPINIROLE 1 MG/1
TABLET, FILM COATED ORAL
Qty: 90 TABLET | Refills: 0 | Status: SHIPPED | OUTPATIENT
Start: 2017-10-23 | End: 2018-02-05 | Stop reason: SDUPTHER

## 2017-11-04 ENCOUNTER — RESULTS ENCOUNTER (OUTPATIENT)
Dept: FAMILY MEDICINE CLINIC | Facility: CLINIC | Age: 82
End: 2017-11-04

## 2017-11-04 DIAGNOSIS — D50.9 IRON DEFICIENCY ANEMIA, UNSPECIFIED IRON DEFICIENCY ANEMIA TYPE: ICD-10-CM

## 2017-11-04 DIAGNOSIS — R53.83 OTHER FATIGUE: ICD-10-CM

## 2017-11-04 DIAGNOSIS — E03.9 HYPOTHYROIDISM, ACQUIRED: ICD-10-CM

## 2017-11-10 RX ORDER — LEVOTHYROXINE SODIUM 0.07 MG/1
75 TABLET ORAL DAILY
Qty: 90 TABLET | Refills: 0 | Status: SHIPPED | OUTPATIENT
Start: 2017-11-10 | End: 2018-02-06 | Stop reason: SDUPTHER

## 2017-11-13 RX ORDER — LEVOTHYROXINE SODIUM 0.07 MG/1
TABLET ORAL
Qty: 90 TABLET | Refills: 0 | OUTPATIENT
Start: 2017-11-13

## 2017-11-15 DIAGNOSIS — I10 ESSENTIAL HYPERTENSION: ICD-10-CM

## 2017-11-15 DIAGNOSIS — K21.9 GASTROESOPHAGEAL REFLUX DISEASE, ESOPHAGITIS PRESENCE NOT SPECIFIED: ICD-10-CM

## 2017-11-16 RX ORDER — PRAVASTATIN SODIUM 20 MG
TABLET ORAL
Qty: 30 TABLET | Refills: 0 | OUTPATIENT
Start: 2017-11-16

## 2017-11-16 RX ORDER — LISINOPRIL 2.5 MG/1
TABLET ORAL
Qty: 30 TABLET | Refills: 0 | OUTPATIENT
Start: 2017-11-16

## 2017-11-17 DIAGNOSIS — K21.9 GASTROESOPHAGEAL REFLUX DISEASE, ESOPHAGITIS PRESENCE NOT SPECIFIED: ICD-10-CM

## 2017-11-17 DIAGNOSIS — I10 ESSENTIAL HYPERTENSION: ICD-10-CM

## 2017-11-20 RX ORDER — PRAVASTATIN SODIUM 20 MG
TABLET ORAL
Qty: 90 TABLET | Refills: 1 | OUTPATIENT
Start: 2017-11-20

## 2017-11-20 RX ORDER — LISINOPRIL 2.5 MG/1
TABLET ORAL
Qty: 90 TABLET | Refills: 1 | OUTPATIENT
Start: 2017-11-20

## 2018-02-02 DIAGNOSIS — G62.9 NEUROPATHY: ICD-10-CM

## 2018-02-02 DIAGNOSIS — G25.81 RLS (RESTLESS LEGS SYNDROME): ICD-10-CM

## 2018-02-02 DIAGNOSIS — K21.9 GASTROESOPHAGEAL REFLUX DISEASE WITHOUT ESOPHAGITIS: ICD-10-CM

## 2018-02-02 RX ORDER — ROPINIROLE 1 MG/1
TABLET, FILM COATED ORAL
Qty: 90 TABLET | Refills: 0 | OUTPATIENT
Start: 2018-02-02

## 2018-02-02 RX ORDER — GABAPENTIN 300 MG/1
CAPSULE ORAL
Qty: 180 CAPSULE | Refills: 0 | OUTPATIENT
Start: 2018-02-02

## 2018-02-02 RX ORDER — PANTOPRAZOLE SODIUM 40 MG/1
TABLET, DELAYED RELEASE ORAL
Qty: 90 TABLET | Refills: 0 | OUTPATIENT
Start: 2018-02-02

## 2018-02-02 RX ORDER — LEVOTHYROXINE SODIUM 0.07 MG/1
TABLET ORAL
Qty: 90 TABLET | Refills: 0 | OUTPATIENT
Start: 2018-02-02

## 2018-02-05 DIAGNOSIS — G25.81 RLS (RESTLESS LEGS SYNDROME): ICD-10-CM

## 2018-02-05 RX ORDER — ROPINIROLE 1 MG/1
TABLET, FILM COATED ORAL
Qty: 90 TABLET | Refills: 0 | OUTPATIENT
Start: 2018-02-05

## 2018-02-05 RX ORDER — ROPINIROLE 1 MG/1
1 TABLET, FILM COATED ORAL NIGHTLY
Qty: 30 TABLET | Refills: 0 | Status: SHIPPED | OUTPATIENT
Start: 2018-02-05 | End: 2018-02-06 | Stop reason: SDUPTHER

## 2018-02-06 ENCOUNTER — OFFICE VISIT (OUTPATIENT)
Dept: FAMILY MEDICINE CLINIC | Facility: CLINIC | Age: 83
End: 2018-02-06

## 2018-02-06 VITALS
WEIGHT: 117 LBS | RESPIRATION RATE: 18 BRPM | HEIGHT: 69 IN | SYSTOLIC BLOOD PRESSURE: 111 MMHG | BODY MASS INDEX: 17.33 KG/M2 | DIASTOLIC BLOOD PRESSURE: 67 MMHG | HEART RATE: 90 BPM | TEMPERATURE: 97.5 F | OXYGEN SATURATION: 98 %

## 2018-02-06 DIAGNOSIS — G25.81 RLS (RESTLESS LEGS SYNDROME): ICD-10-CM

## 2018-02-06 DIAGNOSIS — K21.9 GASTROESOPHAGEAL REFLUX DISEASE, ESOPHAGITIS PRESENCE NOT SPECIFIED: ICD-10-CM

## 2018-02-06 DIAGNOSIS — E78.2 MIXED HYPERLIPIDEMIA: Primary | ICD-10-CM

## 2018-02-06 DIAGNOSIS — I10 ESSENTIAL HYPERTENSION: ICD-10-CM

## 2018-02-06 DIAGNOSIS — K21.9 GASTROESOPHAGEAL REFLUX DISEASE WITHOUT ESOPHAGITIS: ICD-10-CM

## 2018-02-06 PROCEDURE — 99214 OFFICE O/P EST MOD 30 MIN: CPT | Performed by: NURSE PRACTITIONER

## 2018-02-06 RX ORDER — PRAVASTATIN SODIUM 20 MG
20 TABLET ORAL DAILY
Qty: 90 TABLET | Refills: 1 | Status: SHIPPED | OUTPATIENT
Start: 2018-02-06 | End: 2018-09-04 | Stop reason: SDUPTHER

## 2018-02-06 RX ORDER — ROPINIROLE 1 MG/1
1 TABLET, FILM COATED ORAL NIGHTLY
Qty: 90 TABLET | Refills: 1 | Status: SHIPPED | OUTPATIENT
Start: 2018-02-06 | End: 2018-09-04 | Stop reason: SDUPTHER

## 2018-02-06 RX ORDER — LEVOTHYROXINE SODIUM 0.07 MG/1
75 TABLET ORAL DAILY
Qty: 90 TABLET | Refills: 1 | Status: SHIPPED | OUTPATIENT
Start: 2018-02-06 | End: 2018-02-20

## 2018-02-06 RX ORDER — PANTOPRAZOLE SODIUM 40 MG/1
40 TABLET, DELAYED RELEASE ORAL DAILY
Qty: 90 TABLET | Refills: 1 | Status: SHIPPED | OUTPATIENT
Start: 2018-02-06 | End: 2018-09-04 | Stop reason: SDUPTHER

## 2018-02-06 RX ORDER — LISINOPRIL 2.5 MG/1
2.5 TABLET ORAL DAILY
Qty: 90 TABLET | Refills: 1 | Status: SHIPPED | OUTPATIENT
Start: 2018-02-06

## 2018-02-06 NOTE — PROGRESS NOTES
Subjective   Tye Connor is a 85 y.o. male.     History of Present Illness   Tye Connor 85 y.o. male who presents today for routine follow up check and medication refills.  he has a history of   Patient Active Problem List   Diagnosis   • Back pain   • Hx of CABG   • Hyperlipidemia   • Hypothyroidism, acquired   • Kidney calculus   • Neuropathy   • History of carotid endarterectomy   • Essential hypertension   • Acid reflux   • Gastric ulcer   • Compression fracture of thoracic vertebra   • Hypertrophic polyarthritis   • Gait instability   • Memory impairment   • Pulmonary fibrosis   • Early satiety   • Epigastric pain   • Weight loss   • Absolute anemia   .  Since the last visit, he has overall felt well.  He has Hypertenision and is well controlled on medication, GERD and is well controlled on PPI medication, Hyperlipidemia and is well controlled on medication and Hypothyroidism and is well controlled on Rx.  Labs are in desired treatment range.  he has been compliant with current medications have reviewed them.  The patient denies medication side effects.  Due for labs.   Results for orders placed or performed during the hospital encounter of 10/12/17   Tissue Pathology Exam - Tissue, Small Intestine, Duodenum   Result Value Ref Range    Case Report       Surgical Pathology Report                         Case: [id number]                                  Authorizing Provider:  Rony Wolfe MD         Collected:           10/12/2017 02:51 PM          Ordering Location:     [facility]  Received:            10/12/2017 09:57 PM                                 ENDO SUITES                                                                  Pathologist:           Freedom Tay MD                                                          Specimens:   1) - Small Intestine, Duodenum, DUODENAL BIOPSIES                                                   2) - Stomach, GASTRIC BIOPSIES                    "                                                   3) - Large Intestine, Right / Ascending Colon, ASCENDING COLON POLYP                       Final Diagnosis       1.  DUODENUM, BIOPSY:             FRAGMENTS OF DUODENAL MUCOSA WITH PRESERVED VILLOUS ARCHITECTURE AND NO                  SIGNIFICANT PATHOLOGIC CHANGE.            NO HISTOLOGIC EVIDENCE OF CELIAC SPRUE.    2.  STOMACH, BIOPSY:            FRAGMENTS OF GASTRIC MUCOSA WITH MILD CHRONIC GASTRITIS.            NO HELICOBACTER PYLORI ORGANISMS SEEN ON SPECIAL STAIN (DIFF-QUIK).    3.  COLON, ASCENDING, POLYPECTOMY:             TUBULAR ADENOMA.            ONLY LOW-GRADE DYSPLASIA IS IDENTIFIED.    TDJ/    CPT CODES:  1. 22508  2. 54478, 01447  3. 92896      Gross Description       Received are three specimen containers each labeled with the patient's name.      1: Part 1 is additionally labeled \"duodenal biopsies\" and consists of multiple fragments of tan tissue measuring 0.5 cm in aggregate and entirely submitted in 1A.    2: Part 2 is additionally labeled \"gastric biopsies\" and consists of multiple fragments of tan tissue measuring 0.5 cm in aggregate and entirely submitted in 2A.    3: Part 3 is additionally labeled \"ascending colon polyp\" and consists of two fragments of tan tissue measuring up to 2 mm in maximum dimension and entirely submitted in 3A.    TDJ/jse       Microscopic Description       Performed, incorporated in diagnosis.         Embedded Images         The following portions of the patient's history were reviewed and updated as appropriate: allergies, current medications, past family history, past medical history, past social history, past surgical history and problem list.    Review of Systems   Constitutional: Negative for fatigue.   Respiratory: Negative for cough and shortness of breath.    Cardiovascular: Negative for chest pain and palpitations.   Skin: Negative for rash.   Neurological: Negative for dizziness and light-headedness. "   Psychiatric/Behavioral: Negative for dysphoric mood and sleep disturbance. The patient is not nervous/anxious.        Objective   Physical Exam   Constitutional: He is oriented to person, place, and time. He appears well-developed and well-nourished.   Neck: Carotid bruit is not present.   Cardiovascular: Normal rate and regular rhythm.    Pulmonary/Chest: Effort normal and breath sounds normal.   Neurological: He is oriented to person, place, and time.   Skin: Skin is warm and dry.   Psychiatric: He has a normal mood and affect. His behavior is normal. Judgment and thought content normal.   Nursing note and vitals reviewed.      Assessment/Plan   Tye was seen today for restless legs syndrome, hypertension, hyperlipidemia and hypothyroidism.    Diagnoses and all orders for this visit:    Mixed hyperlipidemia  -     Comprehensive metabolic panel  -     Lipid panel  -     CBC and Differential  -     TSH    Gastroesophageal reflux disease, esophagitis presence not specified  -     pravastatin (PRAVACHOL) 20 MG tablet; Take 1 tablet by mouth Daily.    Gastroesophageal reflux disease without esophagitis  -     pantoprazole (PROTONIX) 40 MG EC tablet; Take 1 tablet by mouth Daily.    RLS (restless legs syndrome)  -     rOPINIRole (REQUIP) 1 MG tablet; Take 1 tablet by mouth Every Night. Take 1 hour before bedtime.    Essential hypertension  -     lisinopril (PRINIVIL,ZESTRIL) 2.5 MG tablet; Take 1 tablet by mouth Daily.  -     Comprehensive metabolic panel  -     Lipid panel  -     CBC and Differential  -     TSH    Other orders  -     levothyroxine (SYNTHROID, LEVOTHROID) 75 MCG tablet; Take 1 tablet by mouth Daily.

## 2018-02-10 LAB
ALBUMIN SERPL-MCNC: 4.1 G/DL (ref 3.5–5.2)
ALBUMIN/GLOB SERPL: 1.1 G/DL
ALP SERPL-CCNC: 59 U/L (ref 39–117)
ALT SERPL-CCNC: 6 U/L (ref 1–41)
AST SERPL-CCNC: 20 U/L (ref 1–40)
BASOPHILS # BLD AUTO: 0.01 10*3/MM3 (ref 0–0.2)
BASOPHILS NFR BLD AUTO: 0.1 % (ref 0–1.5)
BILIRUB SERPL-MCNC: 0.9 MG/DL (ref 0.1–1.2)
BUN SERPL-MCNC: 29 MG/DL (ref 8–23)
BUN/CREAT SERPL: 22.3 (ref 7–25)
CALCIUM SERPL-MCNC: 9.4 MG/DL (ref 8.6–10.5)
CHLORIDE SERPL-SCNC: 96 MMOL/L (ref 98–107)
CHOLEST SERPL-MCNC: 137 MG/DL (ref 0–200)
CO2 SERPL-SCNC: 29.7 MMOL/L (ref 22–29)
CREAT SERPL-MCNC: 1.3 MG/DL (ref 0.76–1.27)
EOSINOPHIL # BLD AUTO: 0.28 10*3/MM3 (ref 0–0.7)
EOSINOPHIL NFR BLD AUTO: 3.7 % (ref 0.3–6.2)
ERYTHROCYTE [DISTWIDTH] IN BLOOD BY AUTOMATED COUNT: 13.6 % (ref 11.5–14.5)
GFR SERPLBLD CREATININE-BSD FMLA CKD-EPI: 52 ML/MIN/1.73
GFR SERPLBLD CREATININE-BSD FMLA CKD-EPI: 64 ML/MIN/1.73
GLOBULIN SER CALC-MCNC: 3.7 GM/DL
GLUCOSE SERPL-MCNC: 85 MG/DL (ref 65–99)
HCT VFR BLD AUTO: 35.8 % (ref 40.4–52.2)
HDLC SERPL-MCNC: 45 MG/DL (ref 40–60)
HGB BLD-MCNC: 11.3 G/DL (ref 13.7–17.6)
IMM GRANULOCYTES # BLD: 0.02 10*3/MM3 (ref 0–0.03)
IMM GRANULOCYTES NFR BLD: 0.3 % (ref 0–0.5)
LDLC SERPL CALC-MCNC: 73 MG/DL (ref 0–100)
LYMPHOCYTES # BLD AUTO: 2.05 10*3/MM3 (ref 0.9–4.8)
LYMPHOCYTES NFR BLD AUTO: 27.2 % (ref 19.6–45.3)
MCH RBC QN AUTO: 31.9 PG (ref 27–32.7)
MCHC RBC AUTO-ENTMCNC: 31.6 G/DL (ref 32.6–36.4)
MCV RBC AUTO: 101.1 FL (ref 79.8–96.2)
MONOCYTES # BLD AUTO: 0.58 10*3/MM3 (ref 0.2–1.2)
MONOCYTES NFR BLD AUTO: 7.7 % (ref 5–12)
NEUTROPHILS # BLD AUTO: 4.59 10*3/MM3 (ref 1.9–8.1)
NEUTROPHILS NFR BLD AUTO: 61 % (ref 42.7–76)
PLATELET # BLD AUTO: 194 10*3/MM3 (ref 140–500)
POTASSIUM SERPL-SCNC: 4.2 MMOL/L (ref 3.5–5.2)
PROT SERPL-MCNC: 7.8 G/DL (ref 6–8.5)
RBC # BLD AUTO: 3.54 10*6/MM3 (ref 4.6–6)
SODIUM SERPL-SCNC: 133 MMOL/L (ref 136–145)
TRIGL SERPL-MCNC: 94 MG/DL (ref 0–150)
TSH SERPL DL<=0.005 MIU/L-ACNC: 6.63 MIU/ML (ref 0.27–4.2)
VLDLC SERPL CALC-MCNC: 18.8 MG/DL (ref 5–40)
WBC # BLD AUTO: 7.53 10*3/MM3 (ref 4.5–10.7)

## 2018-02-13 LAB
T4 FREE SERPL-MCNC: 1.14 NG/DL (ref 0.93–1.7)
WRITTEN AUTHORIZATION: NORMAL

## 2018-02-19 DIAGNOSIS — N28.9 LOW KIDNEY FUNCTION: Primary | ICD-10-CM

## 2018-02-20 ENCOUNTER — OFFICE VISIT (OUTPATIENT)
Dept: FAMILY MEDICINE CLINIC | Facility: CLINIC | Age: 83
End: 2018-02-20

## 2018-02-20 VITALS
WEIGHT: 117.5 LBS | DIASTOLIC BLOOD PRESSURE: 62 MMHG | TEMPERATURE: 97.6 F | SYSTOLIC BLOOD PRESSURE: 96 MMHG | RESPIRATION RATE: 16 BRPM | BODY MASS INDEX: 17.4 KG/M2 | HEART RATE: 73 BPM | HEIGHT: 69 IN

## 2018-02-20 DIAGNOSIS — E03.9 HYPOTHYROIDISM, ACQUIRED: Primary | ICD-10-CM

## 2018-02-20 DIAGNOSIS — G62.9 NEUROPATHY: ICD-10-CM

## 2018-02-20 DIAGNOSIS — D50.8 OTHER IRON DEFICIENCY ANEMIA: ICD-10-CM

## 2018-02-20 PROCEDURE — 99214 OFFICE O/P EST MOD 30 MIN: CPT | Performed by: FAMILY MEDICINE

## 2018-02-20 RX ORDER — LEVOTHYROXINE SODIUM 0.1 MG/1
100 TABLET ORAL DAILY
Qty: 90 TABLET | Refills: 1 | Status: SHIPPED | OUTPATIENT
Start: 2018-02-20 | End: 2018-09-04 | Stop reason: SDUPTHER

## 2018-02-20 RX ORDER — GABAPENTIN 300 MG/1
300 CAPSULE ORAL 2 TIMES DAILY
Qty: 180 CAPSULE | Refills: 1 | Status: SHIPPED | OUTPATIENT
Start: 2018-02-20 | End: 2018-09-04 | Stop reason: SDUPTHER

## 2018-02-20 NOTE — PROGRESS NOTES
Subjective   Tye Connor is a 85 y.o. male.     History of Present Illness   Chief Complaint:   Chief Complaint   Patient presents with   • Hypothyroidism   • GI Problem   • Hyperlipidemia   • Hypertension   • Peripheral Neuropathy       Tye Connor 85 y.o. male who presents today for Medical Management of the below listed issues and medication refills.   Refill gabapentin  300mg bid   Increase synthroid 100mcg   Labs 3 months he has a problem list of   Patient Active Problem List   Diagnosis   • Back pain   • Hx of CABG   • Hyperlipidemia   • Hypothyroidism, acquired   • Kidney calculus   • Neuropathy   • History of carotid endarterectomy   • Essential hypertension   • Acid reflux   • Gastric ulcer   • Compression fracture of thoracic vertebra   • Hypertrophic polyarthritis   • Gait instability   • Memory impairment   • Pulmonary fibrosis   • Early satiety   • Epigastric pain   • Weight loss   • Absolute anemia   .  Since the last visit, he has overall felt well.  he has been compliant with   Current Outpatient Prescriptions:   •  calcium citrate-vitamin d (CALCITRATE) 315-250 MG-UNIT tablet tablet, Take 1 tablet by mouth Daily., Disp: , Rfl:   •  cyanocobalamin 100 MCG tablet, Take 100 mcg by mouth Daily., Disp: , Rfl:   •  Diphenhydramine-APAP, sleep, (ACETAMINOPHEN PM PO), Take 1 tablet by mouth As Needed., Disp: , Rfl:   •  gabapentin (NEURONTIN) 300 MG capsule, Take 1 capsule by mouth 2 (Two) Times a Day., Disp: 180 capsule, Rfl: 1  •  Ginkgo Biloba 100 MG capsule, Take 100 mg by mouth Daily., Disp: , Rfl:   •  levothyroxine (SYNTHROID, LEVOTHROID) 75 MCG tablet, Take 1 tablet by mouth Daily., Disp: 90 tablet, Rfl: 1  •  lisinopril (PRINIVIL,ZESTRIL) 2.5 MG tablet, Take 1 tablet by mouth Daily., Disp: 90 tablet, Rfl: 1  •  Multiple Vitamins-Minerals (PRESERVISION AREDS) capsule, Take 1 tablet by mouth Daily., Disp: , Rfl:   •  pantoprazole (PROTONIX) 40 MG EC tablet, Take 1 tablet by mouth Daily.,  "Disp: 90 tablet, Rfl: 1  •  pravastatin (PRAVACHOL) 20 MG tablet, Take 1 tablet by mouth Daily., Disp: 90 tablet, Rfl: 1  •  rOPINIRole (REQUIP) 1 MG tablet, Take 1 tablet by mouth Every Night. Take 1 hour before bedtime., Disp: 90 tablet, Rfl: 1.  he denies medication side effects.    All of the chronic condition(s) listed above are stable w/o issues.    BP 96/62  Pulse 73  Temp 97.6 °F (36.4 °C)  Resp 16  Ht 175 cm (68.9\")  Wt 53.3 kg (117 lb 8 oz)  BMI 17.4 kg/m2    Results for orders placed or performed in visit on 02/06/18   Comprehensive metabolic panel   Result Value Ref Range    Glucose 85 65 - 99 mg/dL    BUN 29 (H) 8 - 23 mg/dL    Creatinine 1.30 (H) 0.76 - 1.27 mg/dL    eGFR Non African Am 52 (L) >60 mL/min/1.73    eGFR African Am 64 >60 mL/min/1.73    BUN/Creatinine Ratio 22.3 7.0 - 25.0    Sodium 133 (L) 136 - 145 mmol/L    Potassium 4.2 3.5 - 5.2 mmol/L    Chloride 96 (L) 98 - 107 mmol/L    Total CO2 29.7 (H) 22.0 - 29.0 mmol/L    Calcium 9.4 8.6 - 10.5 mg/dL    Total Protein 7.8 6.0 - 8.5 g/dL    Albumin 4.10 3.50 - 5.20 g/dL    Globulin 3.7 gm/dL    A/G Ratio 1.1 g/dL    Total Bilirubin 0.9 0.1 - 1.2 mg/dL    Alkaline Phosphatase 59 39 - 117 U/L    AST (SGOT) 20 1 - 40 U/L    ALT (SGPT) 6 1 - 41 U/L   Lipid panel   Result Value Ref Range    Total Cholesterol 137 0 - 200 mg/dL    Triglycerides 94 0 - 150 mg/dL    HDL Cholesterol 45 40 - 60 mg/dL    VLDL Cholesterol 18.8 5 - 40 mg/dL    LDL Cholesterol  73 0 - 100 mg/dL   TSH   Result Value Ref Range    TSH 6.630 (H) 0.270 - 4.200 mIU/mL   T4, Free   Result Value Ref Range    Free T4 1.14 0.93 - 1.70 ng/dL   Written Authorization   Result Value Ref Range    Written Authorization Comment    CBC and Differential   Result Value Ref Range    WBC 7.53 4.50 - 10.70 10*3/mm3    RBC 3.54 (L) 4.60 - 6.00 10*6/mm3    Hemoglobin 11.3 (L) 13.7 - 17.6 g/dL    Hematocrit 35.8 (L) 40.4 - 52.2 %    .1 (H) 79.8 - 96.2 fL    MCH 31.9 27.0 - 32.7 pg    MCHC " 31.6 (L) 32.6 - 36.4 g/dL    RDW 13.6 11.5 - 14.5 %    Platelets 194 140 - 500 10*3/mm3    Neutrophil Rel % 61.0 42.7 - 76.0 %    Lymphocyte Rel % 27.2 19.6 - 45.3 %    Monocyte Rel % 7.7 5.0 - 12.0 %    Eosinophil Rel % 3.7 0.3 - 6.2 %    Basophil Rel % 0.1 0.0 - 1.5 %    Neutrophils Absolute 4.59 1.90 - 8.10 10*3/mm3    Lymphocytes Absolute 2.05 0.90 - 4.80 10*3/mm3    Monocytes Absolute 0.58 0.20 - 1.20 10*3/mm3    Eosinophils Absolute 0.28 0.00 - 0.70 10*3/mm3    Basophils Absolute 0.01 0.00 - 0.20 10*3/mm3    Immature Granulocyte Rel % 0.3 0.0 - 0.5 %    Immature Grans Absolute 0.02 0.00 - 0.03 10*3/mm3           The following portions of the patient's history were reviewed and updated as appropriate: allergies, current medications, past family history, past medical history, past social history, past surgical history and problem list.    Review of Systems   Constitutional: Negative for activity change, appetite change and unexpected weight change.   Eyes: Negative for visual disturbance.   Respiratory: Negative for chest tightness and shortness of breath.    Cardiovascular: Negative for chest pain and palpitations.   Skin: Negative for color change.   Neurological: Negative for syncope and speech difficulty.   Psychiatric/Behavioral: Negative for confusion and decreased concentration.       Objective   Physical Exam   Constitutional: He is oriented to person, place, and time. He appears well-developed and well-nourished.   HENT:   Head: Normocephalic.   Nose: Nose normal.   Eyes: EOM are normal. Pupils are equal, round, and reactive to light.   Neck: Normal range of motion. Neck supple. No thyromegaly present.   Cardiovascular: Normal rate and regular rhythm.    Pulmonary/Chest: Effort normal and breath sounds normal.   Abdominal: Soft.   Neurological: He is alert and oriented to person, place, and time.   Skin: Skin is warm and dry.   Psychiatric: He has a normal mood and affect.       Assessment/Plan   Tye  was seen today for hypothyroidism, gi problem, hyperlipidemia, hypertension and peripheral neuropathy.    Diagnoses and all orders for this visit:    Hypothyroidism, acquired  -     TSH; Future  -     CBC & Differential; Future  -     Iron Profile; Future    Neuropathy  -     gabapentin (NEURONTIN) 300 MG capsule; Take 1 capsule by mouth 2 (Two) Times a Day.  -     TSH; Future  -     CBC & Differential; Future  -     Iron Profile; Future    Other iron deficiency anemia  -     TSH; Future  -     CBC & Differential; Future  -     Iron Profile; Future    Other orders  -     levothyroxine (SYNTHROID, LEVOTHROID) 100 MCG tablet; Take 1 tablet by mouth Daily.

## 2018-02-22 ENCOUNTER — DOCUMENTATION (OUTPATIENT)
Dept: FAMILY MEDICINE CLINIC | Facility: CLINIC | Age: 83
End: 2018-02-22

## 2018-04-20 ENCOUNTER — RESULTS ENCOUNTER (OUTPATIENT)
Dept: FAMILY MEDICINE CLINIC | Facility: CLINIC | Age: 83
End: 2018-04-20

## 2018-04-20 DIAGNOSIS — E03.9 HYPOTHYROIDISM, ACQUIRED: ICD-10-CM

## 2018-04-20 DIAGNOSIS — D50.8 OTHER IRON DEFICIENCY ANEMIA: ICD-10-CM

## 2018-04-20 DIAGNOSIS — G62.9 NEUROPATHY: ICD-10-CM

## 2018-05-09 LAB
BASOPHILS # BLD AUTO: 0.03 10*3/MM3 (ref 0–0.2)
BASOPHILS NFR BLD AUTO: 0.3 % (ref 0–1.5)
EOSINOPHIL # BLD AUTO: 0.59 10*3/MM3 (ref 0–0.7)
EOSINOPHIL NFR BLD AUTO: 6.2 % (ref 0.3–6.2)
ERYTHROCYTE [DISTWIDTH] IN BLOOD BY AUTOMATED COUNT: 13.4 % (ref 11.5–14.5)
HCT VFR BLD AUTO: 36.8 % (ref 40.4–52.2)
HGB BLD-MCNC: 11.9 G/DL (ref 13.7–17.6)
IMM GRANULOCYTES # BLD: 0.02 10*3/MM3 (ref 0–0.03)
IMM GRANULOCYTES NFR BLD: 0.2 % (ref 0–0.5)
IRON SATN MFR SERPL: 27 % (ref 20–50)
IRON SERPL-MCNC: 76 MCG/DL (ref 59–158)
LYMPHOCYTES # BLD AUTO: 2.31 10*3/MM3 (ref 0.9–4.8)
LYMPHOCYTES NFR BLD AUTO: 24.4 % (ref 19.6–45.3)
MCH RBC QN AUTO: 33 PG (ref 27–32.7)
MCHC RBC AUTO-ENTMCNC: 32.3 G/DL (ref 32.6–36.4)
MCV RBC AUTO: 101.9 FL (ref 79.8–96.2)
MONOCYTES # BLD AUTO: 0.74 10*3/MM3 (ref 0.2–1.2)
MONOCYTES NFR BLD AUTO: 7.8 % (ref 5–12)
NEUTROPHILS # BLD AUTO: 5.81 10*3/MM3 (ref 1.9–8.1)
NEUTROPHILS NFR BLD AUTO: 61.3 % (ref 42.7–76)
PLATELET # BLD AUTO: 221 10*3/MM3 (ref 140–500)
RBC # BLD AUTO: 3.61 10*6/MM3 (ref 4.6–6)
TIBC SERPL-MCNC: 286 MCG/DL
TSH SERPL DL<=0.005 MIU/L-ACNC: 0.57 MIU/ML (ref 0.27–4.2)
UIBC SERPL-MCNC: 210 MCG/DL
WBC # BLD AUTO: 9.48 10*3/MM3 (ref 4.5–10.7)

## 2018-05-21 ENCOUNTER — OFFICE VISIT (OUTPATIENT)
Dept: FAMILY MEDICINE CLINIC | Facility: CLINIC | Age: 83
End: 2018-05-21

## 2018-05-21 VITALS
RESPIRATION RATE: 16 BRPM | TEMPERATURE: 97.4 F | SYSTOLIC BLOOD PRESSURE: 96 MMHG | HEART RATE: 108 BPM | HEIGHT: 69 IN | DIASTOLIC BLOOD PRESSURE: 58 MMHG | OXYGEN SATURATION: 96 %

## 2018-05-21 DIAGNOSIS — Z80.42 FAMILY HISTORY OF PROSTATE CANCER: ICD-10-CM

## 2018-05-21 DIAGNOSIS — Z12.5 SCREENING FOR MALIGNANT NEOPLASM OF PROSTATE: ICD-10-CM

## 2018-05-21 DIAGNOSIS — R53.83 OTHER FATIGUE: ICD-10-CM

## 2018-05-21 DIAGNOSIS — R63.4 WEIGHT LOSS, ABNORMAL: Primary | ICD-10-CM

## 2018-05-21 PROCEDURE — 99214 OFFICE O/P EST MOD 30 MIN: CPT | Performed by: FAMILY MEDICINE

## 2018-05-21 NOTE — PROGRESS NOTES
Subjective   Tye Connor is a 85 y.o. male.     History of Present Illness   Chief Complaint:   Chief Complaint   Patient presents with   • Hypothyroidism   • Peripheral Neuropathy       Tye Connor 85 y.o. male who presents today for Medical Management of the below listed issues. I reviewed his lab resullts. He complains that his neuropathy is getting worse.   he has a problem list of  8lb weight loss past 3 months, neuropathy legs. Anorexia. utd on colonoscopy  Both upper and lower.  Needs labs, and refer to [facility].  Patient Active Problem List   Diagnosis   • Back pain   • Hx of CABG   • Hyperlipidemia   • Hypothyroidism, acquired   • Kidney calculus   • Neuropathy   • History of carotid endarterectomy   • Essential hypertension   • Acid reflux   • Gastric ulcer   • Compression fracture of thoracic vertebra   • Hypertrophic polyarthritis   • Gait instability   • Memory impairment   • Pulmonary fibrosis   • Early satiety   • Epigastric pain   • Weight loss   • Absolute anemia   .  Since the last visit, he has overall felt well.  he has been compliant with   Current Outpatient Prescriptions:   •  calcium citrate-vitamin d (CALCITRATE) 315-250 MG-UNIT tablet tablet, Take 1 tablet by mouth Daily., Disp: , Rfl:   •  cyanocobalamin 100 MCG tablet, Take 100 mcg by mouth Daily., Disp: , Rfl:   •  Diphenhydramine-APAP, sleep, (ACETAMINOPHEN PM PO), Take 1 tablet by mouth As Needed., Disp: , Rfl:   •  gabapentin (NEURONTIN) 300 MG capsule, Take 1 capsule by mouth 2 (Two) Times a Day., Disp: 180 capsule, Rfl: 1  •  Ginkgo Biloba 100 MG capsule, Take 100 mg by mouth Daily., Disp: , Rfl:   •  levothyroxine (SYNTHROID, LEVOTHROID) 100 MCG tablet, Take 1 tablet by mouth Daily., Disp: 90 tablet, Rfl: 1  •  lisinopril (PRINIVIL,ZESTRIL) 2.5 MG tablet, Take 1 tablet by mouth Daily., Disp: 90 tablet, Rfl: 1  •  Multiple Vitamins-Minerals (PRESERVISION AREDS) capsule, Take 1 tablet by mouth Daily., Disp: , Rfl:   •   "pantoprazole (PROTONIX) 40 MG EC tablet, Take 1 tablet by mouth Daily., Disp: 90 tablet, Rfl: 1  •  pravastatin (PRAVACHOL) 20 MG tablet, Take 1 tablet by mouth Daily., Disp: 90 tablet, Rfl: 1  •  rOPINIRole (REQUIP) 1 MG tablet, Take 1 tablet by mouth Every Night. Take 1 hour before bedtime., Disp: 90 tablet, Rfl: 1.  he denies medication side effects.    All of the chronic condition(s) listed above are stable w/o issues.    BP 96/58   Pulse 108   Temp 97.4 °F (36.3 °C) (Oral)   Resp 16   Ht 175 cm (68.9\")   SpO2 96%     Results for orders placed or performed in visit on 04/20/18   TSH   Result Value Ref Range    TSH 0.575 0.270 - 4.200 mIU/mL   Iron Profile   Result Value Ref Range    TIBC 286 mcg/dL    UIBC 210 mcg/dL    Iron 76 59 - 158 mcg/dL    Iron Saturation 27 20 - 50 %   CBC & Differential   Result Value Ref Range    WBC 9.48 4.50 - 10.70 10*3/mm3    RBC 3.61 (L) 4.60 - 6.00 10*6/mm3    Hemoglobin 11.9 (L) 13.7 - 17.6 g/dL    Hematocrit 36.8 (L) 40.4 - 52.2 %    .9 (H) 79.8 - 96.2 fL    MCH 33.0 (H) 27.0 - 32.7 pg    MCHC 32.3 (L) 32.6 - 36.4 g/dL    RDW 13.4 11.5 - 14.5 %    Platelets 221 140 - 500 10*3/mm3    Neutrophil Rel % 61.3 42.7 - 76.0 %    Lymphocyte Rel % 24.4 19.6 - 45.3 %    Monocyte Rel % 7.8 5.0 - 12.0 %    Eosinophil Rel % 6.2 0.3 - 6.2 %    Basophil Rel % 0.3 0.0 - 1.5 %    Neutrophils Absolute 5.81 1.90 - 8.10 10*3/mm3    Lymphocytes Absolute 2.31 0.90 - 4.80 10*3/mm3    Monocytes Absolute 0.74 0.20 - 1.20 10*3/mm3    Eosinophils Absolute 0.59 0.00 - 0.70 10*3/mm3    Basophils Absolute 0.03 0.00 - 0.20 10*3/mm3    Immature Granulocyte Rel % 0.2 0.0 - 0.5 %    Immature Grans Absolute 0.02 0.00 - 0.03 10*3/mm3           The following portions of the patient's history were reviewed and updated as appropriate: allergies, current medications, past family history, past medical history, past social history, past surgical history and problem list.    Review of Systems "   Constitutional: Negative for activity change, appetite change and unexpected weight change.   Eyes: Negative for visual disturbance.   Respiratory: Negative for chest tightness and shortness of breath.    Cardiovascular: Negative for chest pain and palpitations.   Endocrine: Negative for cold intolerance and heat intolerance.   Musculoskeletal: Positive for back pain.   Skin: Negative for color change.   Neurological: Negative for tremors, syncope and speech difficulty.   Psychiatric/Behavioral: Negative for behavioral problems, confusion and decreased concentration.       Objective   Physical Exam   Constitutional: He is oriented to person, place, and time.   HENT:   Head: Normocephalic.   Mouth/Throat: Oropharynx is clear and moist.   Eyes: Pupils are equal, round, and reactive to light.   Neck: Normal range of motion. No thyromegaly present.   Cardiovascular: Normal rate and regular rhythm.    Pulmonary/Chest: Effort normal and breath sounds normal.   Abdominal: Soft. Bowel sounds are normal. There is no tenderness.   Musculoskeletal: Normal range of motion. He exhibits no edema.   Neurological: He is alert and oriented to person, place, and time.   Neuropathy legs   Skin: Skin is warm and dry. No rash noted.   Psychiatric: He has a normal mood and affect. His behavior is normal.   Nursing note and vitals reviewed.      Assessment/Plan   Tye was seen today for hypothyroidism and peripheral neuropathy.    Diagnoses and all orders for this visit:    Weight loss, abnormal  -     Comprehensive Metabolic Panel  -     Vitamin B12  -     Folate  -     Ambulatory Referral to Hematology / Oncology    Other fatigue  -     Comprehensive Metabolic Panel  -     Vitamin B12  -     Folate  -     Ambulatory Referral to Hematology / Oncology    Family history of prostate cancer  -     PSA DIAGNOSTIC  -     Ambulatory Referral to Hematology / Oncology    Screening for malignant neoplasm of prostate  -     PSA DIAGNOSTIC  -      Ambulatory Referral to Hematology / Oncology

## 2018-05-22 LAB
ALBUMIN SERPL-MCNC: 3.9 G/DL (ref 3.5–5.2)
ALBUMIN/GLOB SERPL: 1 G/DL
ALP SERPL-CCNC: 61 U/L (ref 39–117)
ALT SERPL-CCNC: 8 U/L (ref 1–41)
AST SERPL-CCNC: 15 U/L (ref 1–40)
BILIRUB SERPL-MCNC: 0.8 MG/DL (ref 0.1–1.2)
BUN SERPL-MCNC: 26 MG/DL (ref 8–23)
BUN/CREAT SERPL: 19.3 (ref 7–25)
CALCIUM SERPL-MCNC: 9.6 MG/DL (ref 8.6–10.5)
CHLORIDE SERPL-SCNC: 98 MMOL/L (ref 98–107)
CO2 SERPL-SCNC: 26 MMOL/L (ref 22–29)
CREAT SERPL-MCNC: 1.35 MG/DL (ref 0.76–1.27)
FOLATE SERPL-MCNC: >20 NG/ML (ref 4.78–24.2)
GFR SERPLBLD CREATININE-BSD FMLA CKD-EPI: 50 ML/MIN/1.73
GFR SERPLBLD CREATININE-BSD FMLA CKD-EPI: 61 ML/MIN/1.73
GLOBULIN SER CALC-MCNC: 4.1 GM/DL
GLUCOSE SERPL-MCNC: 105 MG/DL (ref 65–99)
POTASSIUM SERPL-SCNC: 4.1 MMOL/L (ref 3.5–5.2)
PROT SERPL-MCNC: 8 G/DL (ref 6–8.5)
PSA SERPL-MCNC: 0.96 NG/ML (ref 0–4)
SODIUM SERPL-SCNC: 139 MMOL/L (ref 136–145)
VIT B12 SERPL-MCNC: 1740 PG/ML (ref 211–946)

## 2018-05-25 ENCOUNTER — CONSULT (OUTPATIENT)
Dept: ONCOLOGY | Facility: CLINIC | Age: 83
End: 2018-05-25

## 2018-05-25 ENCOUNTER — LAB (OUTPATIENT)
Dept: LAB | Facility: HOSPITAL | Age: 83
End: 2018-05-25

## 2018-05-25 VITALS
WEIGHT: 111.6 LBS | TEMPERATURE: 97.5 F | BODY MASS INDEX: 18.59 KG/M2 | HEART RATE: 76 BPM | RESPIRATION RATE: 16 BRPM | HEIGHT: 65 IN | DIASTOLIC BLOOD PRESSURE: 80 MMHG | OXYGEN SATURATION: 98 % | SYSTOLIC BLOOD PRESSURE: 100 MMHG

## 2018-05-25 DIAGNOSIS — D64.9 ANEMIA, UNSPECIFIED TYPE: Primary | ICD-10-CM

## 2018-05-25 DIAGNOSIS — J84.10 PULMONARY FIBROSIS (HCC): ICD-10-CM

## 2018-05-25 DIAGNOSIS — R63.4 WEIGHT LOSS: Primary | ICD-10-CM

## 2018-05-25 LAB
BASOPHILS # BLD AUTO: 0.03 10*3/MM3 (ref 0–0.1)
BASOPHILS NFR BLD AUTO: 0.3 % (ref 0–1.1)
DEPRECATED RDW RBC AUTO: 43.4 FL (ref 37–49)
EOSINOPHIL # BLD AUTO: 0.4 10*3/MM3 (ref 0–0.36)
EOSINOPHIL NFR BLD AUTO: 4.5 % (ref 1–5)
ERYTHROCYTE [DISTWIDTH] IN BLOOD BY AUTOMATED COUNT: 12.5 % (ref 11.7–14.5)
ERYTHROCYTE [SEDIMENTATION RATE] IN BLOOD: 80 MM/HR (ref 0–20)
HCT VFR BLD AUTO: 34.2 % (ref 40–49)
HGB BLD-MCNC: 11.6 G/DL (ref 13.5–16.5)
IMM GRANULOCYTES # BLD: 0.06 10*3/MM3 (ref 0–0.03)
IMM GRANULOCYTES NFR BLD: 0.7 % (ref 0–0.5)
LYMPHOCYTES # BLD AUTO: 2.26 10*3/MM3 (ref 1–3.5)
LYMPHOCYTES NFR BLD AUTO: 25.3 % (ref 20–49)
MCH RBC QN AUTO: 32.2 PG (ref 27–33)
MCHC RBC AUTO-ENTMCNC: 33.9 G/DL (ref 32–35)
MCV RBC AUTO: 95 FL (ref 83–97)
MONOCYTES # BLD AUTO: 0.9 10*3/MM3 (ref 0.25–0.8)
MONOCYTES NFR BLD AUTO: 10.1 % (ref 4–12)
NEUTROPHILS # BLD AUTO: 5.29 10*3/MM3 (ref 1.5–7)
NEUTROPHILS NFR BLD AUTO: 59.1 % (ref 39–75)
NRBC BLD MANUAL-RTO: 0 /100 WBC (ref 0–0)
PLATELET # BLD AUTO: 175 10*3/MM3 (ref 150–375)
PMV BLD AUTO: 9.3 FL (ref 8.9–12.1)
RBC # BLD AUTO: 3.6 10*6/MM3 (ref 4.3–5.5)
WBC NRBC COR # BLD: 8.94 10*3/MM3 (ref 4–10)

## 2018-05-25 PROCEDURE — 36415 COLL VENOUS BLD VENIPUNCTURE: CPT | Performed by: INTERNAL MEDICINE

## 2018-05-25 PROCEDURE — 85652 RBC SED RATE AUTOMATED: CPT | Performed by: INTERNAL MEDICINE

## 2018-05-25 PROCEDURE — 85025 COMPLETE CBC W/AUTO DIFF WBC: CPT | Performed by: INTERNAL MEDICINE

## 2018-05-25 PROCEDURE — 99215 OFFICE O/P EST HI 40 MIN: CPT | Performed by: INTERNAL MEDICINE

## 2018-05-25 NOTE — PROGRESS NOTES
Subjective     REASON FOR CONSULTATION:  History of polyclonal IgA gammopathy, weight loss  Provide an opinion on any further workup or treatment                             REQUESTING PHYSICIAN:  [place]    RECORDS OBTAINED:  Records of the patients history including those obtained from the referring provider were reviewed and summarized in detail.  The patient's daughter and wife provided additional information today.    History of Present Illness   This is a very pleasant 85-year-old man who was previously seen by Dr. Hamilton of this practice for evaluation of gammopathy.  The patient has history of idiopathic neuropathy and was found by neurology to have an elevated IgA.  However, the IgA was found to be polyclonal and not monoclonal, and therefore felt to be a benign finding.  The patient has not been seen since January 2016.    The patient has been feeling poorly over the past several months.  He reports very poor appetite, no specific nausea or vomiting, just lack of interest in eating.  He denies headache or double vision.  It appears he has had an evaluation by gastroenterology, Dr. Lopez.  He underwent an EGD and colonoscopy in October 2017 with no malignant findings.  The last GI note indicates CT of the abdomen would be performed if scopes were negative, but I do not see where that was performed.  The patient denies overt abdominal pain.  He denies diarrhea.  He denies fever.  He has been taking one ensure plus a day for calorie supplementation.  He continues to lose weight, and reviewing his medical chart, there has been an approximate 15 pound weight loss over the past one year and 5 pound weight loss over the past 3 months, currently weighing only 111 pounds.    He complains of generalized weakness.  He denies focal weakness.    He complains of shortness of breath and has history of pulmonary fibrosis.  He stopped smoking in 1995.    Recent lab testing from his primary care provider shows a mild  macrocytic anemia hemoglobin 11.9 and  with normal white blood cell and platelet counts.  TSH was normal at 0.575.  Gastric acid level was greater than 20 and B12 level 1740.  A PSA was less than 1.  CMP shows elevation of the serum creatinine 1.35 and normal liver function tests.  Total protein is 8.0 and albumin 3.9.    Past Medical History:   Diagnosis Date   • Anemia    • Arthritis     osteoarthritis   • Back pain    • CAD (coronary artery disease)    • Dementia     Early dementia, on Aricept   • GERD (gastroesophageal reflux disease)    • H/O CT scan of abdomen 05/31/2016    interstitial fibrosis, r renal cyst   • History of carotid endarterectomy     left   • History of transfusion    • History of urinary tract infection 2014   • Hx of CABG    • Hyperlipidemia    • Hypertension    • Hypothyroid    • Hypothyroidism, acquired    • Kidney calculus    • Macular degeneration    • Neuropathy    • Osteoporosis    • Peptic ulceration    • Peripheral neuropathy    • PUD (peptic ulcer disease)    • Pulmonary fibrosis    • Screening for glaucoma 08/15/2012    Dr. Thompson; macular degeneration   • Stomach pain         Past Surgical History:   Procedure Laterality Date   • CAROTID ENDARTERECTOMY  2003   • CATARACT EXTRACTION Right 03/14/2012    Dr. Elder   • COLONOSCOPY     • COLONOSCOPY N/A 10/12/2017    Procedure: COLONOSCOPY TO CECUM AND TERMINAL ILEUM WITH COLD BIOPSY POLYPECTOMY;  Surgeon: Rony Wolfe MD;  Location: [facility] ENDOSCOPY;  Service:    • CORONARY ARTERY BYPASS GRAFT  10/1994   • ENDOSCOPY N/A 6/2/2016    Gastritis    • ENDOSCOPY  08/07/2014    erosive gastritis, duodenitis.   • ENDOSCOPY N/A 10/12/2017    Procedure: ESOPHAGOGASTRODUODENOSCOPY WITH BIOPSIES;  Surgeon: Rony Wolfe MD;  Location: [facility] ENDOSCOPY;  Service:    • KYPHOPLASTY  2014    at T12        Current Outpatient Prescriptions on File Prior to Visit   Medication Sig Dispense Refill   • calcium citrate-vitamin d (CALCITRATE)  315-250 MG-UNIT tablet tablet Take 1 tablet by mouth Daily.     • Diphenhydramine-APAP, sleep, (ACETAMINOPHEN PM PO) Take 1 tablet by mouth As Needed.     • gabapentin (NEURONTIN) 300 MG capsule Take 1 capsule by mouth 2 (Two) Times a Day. 180 capsule 1   • Ginkgo Biloba 100 MG capsule Take 100 mg by mouth Daily.     • levothyroxine (SYNTHROID, LEVOTHROID) 100 MCG tablet Take 1 tablet by mouth Daily. 90 tablet 1   • lisinopril (PRINIVIL,ZESTRIL) 2.5 MG tablet Take 1 tablet by mouth Daily. 90 tablet 1   • Multiple Vitamins-Minerals (PRESERVISION AREDS) capsule Take 1 tablet by mouth Daily.     • pantoprazole (PROTONIX) 40 MG EC tablet Take 1 tablet by mouth Daily. 90 tablet 1   • pravastatin (PRAVACHOL) 20 MG tablet Take 1 tablet by mouth Daily. 90 tablet 1   • rOPINIRole (REQUIP) 1 MG tablet Take 1 tablet by mouth Every Night. Take 1 hour before bedtime. 90 tablet 1   • [DISCONTINUED] cyanocobalamin 100 MCG tablet Take 100 mcg by mouth Daily.       No current facility-administered medications on file prior to visit.         ALLERGIES:    Allergies   Allergen Reactions   • Iodinated Diagnostic Agents Shortness Of Breath        Social History     Social History   • Marital status:      Spouse name: Gunjan   • Years of education: College     Occupational History   • CPA Retired     Social History Main Topics   • Smoking status: Former Smoker   • Smokeless tobacco: Never Used   • Alcohol use No   • Drug use: No   • Sexual activity: Defer     Other Topics Concern   • Not on file        Family History   Problem Relation Age of Onset   • Cancer Brother         prostate   • Heart disease Brother    • Pancreatic cancer Brother    • Coronary artery disease Other    • Heart disease Other    • Heart disease Mother    • Cancer Father    • Leukemia Father    • No Known Problems Maternal Grandmother    • No Known Problems Maternal Grandfather    • No Known Problems Paternal Grandmother    • No Known Problems Paternal  "Grandfather    • Breast cancer Daughter    • Heart disease Brother    • Heart failure Brother    • Melanoma Brother    • Malig Hyperthermia Neg Hx         Review of Systems   Constitutional: Positive for activity change, appetite change, fatigue and unexpected weight change. Negative for fever.   HENT: Negative.    Eyes: Negative.    Respiratory: Positive for shortness of breath. Negative for cough, chest tightness and wheezing.    Cardiovascular: Negative for chest pain, palpitations and leg swelling.   Gastrointestinal: Negative for abdominal pain, blood in stool, diarrhea, nausea and vomiting.   Genitourinary: Negative.    Musculoskeletal: Positive for arthralgias, back pain and gait problem. Negative for neck stiffness.   Skin: Negative.    Allergic/Immunologic: Negative for immunocompromised state.   Neurological: Positive for weakness. Negative for dizziness, syncope, light-headedness and headaches.   Hematological: Negative for adenopathy. Does not bruise/bleed easily.   Psychiatric/Behavioral: Positive for sleep disturbance. Negative for agitation and confusion.        Objective     Vitals:    05/25/18 1245   BP: 100/80   Pulse: 76   Resp: 16   Temp: 97.5 °F (36.4 °C)   SpO2: 98%  Comment: at rest   Weight: 50.6 kg (111 lb 9.6 oz)   Height: 165 cm (64.96\")  Comment: new ht w/shoes   PainSc:   6   PainLoc: Back     Current Status 5/25/2018   ECOG score 0       Physical Exam    CON: pleasant thin, frail appearing elderly man  HEENT: no icterus, no thrush, moist membranes, slight ptosis right eyelid, EOMI  NECK: no jvd  LYMPH: no cervical or supraclavicular lad  CV: RRR, S1S2, no murmur  RESP: diminished bs, faint crackles bilaterally  GI: soft, non-tender, no splenomegaly, +bs, very thin  MUSC: no edema, unsteady gait  NEURO: alert and oriented x3, generalized weakness  PSYCH: normal mood    RECENT LABS:  Hematology WBC   Date Value Ref Range Status   05/25/2018 8.94 4.00 - 10.00 10*3/mm3 Final     RBC   Date " Value Ref Range Status   05/25/2018 3.60 (L) 4.30 - 5.50 10*6/mm3 Final   05/08/2018 3.61 (L) 4.60 - 6.00 10*6/mm3 Final     Hemoglobin   Date Value Ref Range Status   05/25/2018 11.6 (L) 13.5 - 16.5 g/dL Final     Hematocrit   Date Value Ref Range Status   05/25/2018 34.2 (L) 40.0 - 49.0 % Final     Platelets   Date Value Ref Range Status   05/25/2018 175 150 - 375 10*3/mm3 Final        Lab Results   Component Value Date    GLUCOSE 96 04/15/2016    BUN 26 (H) 05/21/2018    CREATININE 1.35 (H) 05/21/2018    EGFRIFNONA 50 (L) 05/21/2018    EGFRIFAFRI 61 05/21/2018    BCR 19.3 05/21/2018    K 4.1 05/21/2018    CO2 26.0 05/21/2018    CALCIUM 9.6 05/21/2018    PROTENTOTREF 8.0 05/21/2018    ALBUMIN 3.90 05/21/2018    LABIL2 1.0 05/21/2018    AST 15 05/21/2018    ALT 8 05/21/2018         Assessment/Plan     1.  H/O polyclonal IgA  2.  Mild macrocytic anemia--B12, folic acid, TSH normal  3.  Idiopathic neuropathy  4.  Unusual weight loss secondary to poor appetite/anorexia--colonoscopy and EGD negative 10/2017  5.  Pulmonary fibrosis  6.  Family history prostate cancer--negative PSA    I recommended that we repeat the patient's serum protein electrophoresis, immunofixation and a free light chain ratio.  Given his weight loss I will check a sedimentation rate.  Also given his significant weight loss and anorexia, history of back abuse and pulmonary fibrosis, I recommended we check a CT scan of the chest, abdomen, and pelvis.  I will not give IV contrast as the patient's serum creatinine is elevated at 1.35 and he gives a history of iodine allergy.    I will see him back in 2 weeks to review results.

## 2018-05-26 LAB
KAPPA LC SERPL-MCNC: 82.8 MG/L (ref 3.3–19.4)
KAPPA LC/LAMBDA SER: 1.15 {RATIO} (ref 0.26–1.65)
LAMBDA LC FREE SERPL-MCNC: 71.7 MG/L (ref 5.7–26.3)

## 2018-05-29 LAB
ALBUMIN SERPL-MCNC: 3.4 G/DL (ref 2.9–4.4)
ALBUMIN/GLOB SERPL: 0.8 {RATIO} (ref 0.7–1.7)
ALPHA1 GLOB FLD ELPH-MCNC: 0.3 G/DL (ref 0–0.4)
ALPHA2 GLOB SERPL ELPH-MCNC: 0.9 G/DL (ref 0.4–1)
B-GLOBULIN SERPL ELPH-MCNC: 1.3 G/DL (ref 0.7–1.3)
GAMMA GLOB SERPL ELPH-MCNC: 1.9 G/DL (ref 0.4–1.8)
GLOBULIN SER CALC-MCNC: 4.3 G/DL (ref 2.2–3.9)
IGA SERPL-MCNC: 1057 MG/DL (ref 61–437)
IGG SERPL-MCNC: 1603 MG/DL (ref 700–1600)
IGM SERPL-MCNC: 53 MG/DL (ref 15–143)
Lab: ABNORMAL
M-SPIKE: ABNORMAL G/DL
PROT PATTERN SERPL IFE-IMP: ABNORMAL
PROT SERPL-MCNC: 7.7 G/DL (ref 6–8.5)

## 2018-06-04 ENCOUNTER — HOSPITAL ENCOUNTER (OUTPATIENT)
Dept: PET IMAGING | Facility: HOSPITAL | Age: 83
Discharge: HOME | End: 2018-06-04
Attending: INTERNAL MEDICINE | Admitting: INTERNAL MEDICINE

## 2018-06-04 DIAGNOSIS — J84.10 PULMONARY FIBROSIS (HCC): ICD-10-CM

## 2018-06-04 DIAGNOSIS — R63.4 WEIGHT LOSS: ICD-10-CM

## 2018-06-04 PROCEDURE — 71250 CT THORAX DX C-: CPT

## 2018-06-04 PROCEDURE — 74176 CT ABD & PELVIS W/O CONTRAST: CPT

## 2018-06-04 PROCEDURE — 0 DIATRIZOATE MEGLUMINE & SODIUM PER 1 ML: Performed by: INTERNAL MEDICINE

## 2018-06-04 RX ADMIN — DIATRIZOATE MEGLUMINE AND DIATRIZOATE SODIUM 30 ML: 660; 100 LIQUID ORAL; RECTAL at 13:00

## 2018-06-07 ENCOUNTER — APPOINTMENT (OUTPATIENT)
Dept: LAB | Facility: HOSPITAL | Age: 83
End: 2018-06-07

## 2018-06-07 ENCOUNTER — OFFICE VISIT (OUTPATIENT)
Dept: ONCOLOGY | Facility: CLINIC | Age: 83
End: 2018-06-07

## 2018-06-07 VITALS
WEIGHT: 111.6 LBS | BODY MASS INDEX: 18.59 KG/M2 | RESPIRATION RATE: 16 BRPM | OXYGEN SATURATION: 99 % | HEART RATE: 98 BPM | DIASTOLIC BLOOD PRESSURE: 58 MMHG | SYSTOLIC BLOOD PRESSURE: 112 MMHG | TEMPERATURE: 97.8 F | HEIGHT: 65 IN

## 2018-06-07 DIAGNOSIS — D53.9 MACROCYTIC ANEMIA: ICD-10-CM

## 2018-06-07 DIAGNOSIS — R63.4 WEIGHT LOSS: Primary | ICD-10-CM

## 2018-06-07 DIAGNOSIS — D89.0 POLYCLONAL GAMMOPATHY: ICD-10-CM

## 2018-06-07 PROCEDURE — 99214 OFFICE O/P EST MOD 30 MIN: CPT | Performed by: INTERNAL MEDICINE

## 2018-06-07 PROCEDURE — G0463 HOSPITAL OUTPT CLINIC VISIT: HCPCS | Performed by: INTERNAL MEDICINE

## 2018-06-07 NOTE — PROGRESS NOTES
Subjective     REASON FOR CONSULTATION:  History of polyclonal IgA gammopathy, weight loss  Provide an opinion on any further workup or treatment                             REQUESTING PHYSICIAN:  [place]    RECORDS OBTAINED:  Records of the patients history including those obtained from the referring provider were reviewed and summarized in detail.  The patient's daughter and wife provided additional information today.    History of Present Illness   This is a very pleasant 85-year-old man who was previously seen by Dr. Hamilton of this practice for evaluation of gammopathy.  The patient has history of idiopathic neuropathy and was found by neurology to have an elevated IgA.  However, the IgA was found to be polyclonal and not monoclonal, and therefore felt to be a benign finding.  The patient has not been seen since January 2016.    The patient has been feeling poorly over the past several months.  He reports very poor appetite, no specific nausea or vomiting, just lack of interest in eating.  He denies headache or double vision.  It appears he has had an evaluation by gastroenterology, Dr. Lopez.  He underwent an EGD and colonoscopy in October 2017 with no malignant findings.  The last GI note indicates CT of the abdomen would be performed if scopes were negative, but I do not see where that was performed.  The patient denies overt abdominal pain.  He denies diarrhea.  He denies fever.  He has been taking one ensure plus a day for calorie supplementation.  He continues to lose weight, and reviewing his medical chart, there has been an approximate 15 pound weight loss over the past one year and 5 pound weight loss over the past 3 months, currently weighing only 111 pounds.    He complains of generalized weakness.  He denies focal weakness.    He complains of shortness of breath and has history of pulmonary fibrosis.  He stopped smoking in 1995.    Recent lab testing from his primary care provider shows a mild  macrocytic anemia hemoglobin 11.9 and  with normal white blood cell and platelet counts.  TSH was normal at 0.575.  Folic acid level was greater than 20 and B12 level 1740.  A PSA was less than 1.  CMP shows elevation of the serum creatinine 1.35 and normal liver function tests.  Total protein is 8.0 and albumin 3.9.    I saw the patient on 5/25/18 and ordered protein electrophoresis, immunofixation, free light chain ratio, sedimentation rate, and noncontrasted CT scan of the chest, abdomen, and pelvis.  He returned today for review.  He remains generally weak with low appetite.    Past Medical History:   Diagnosis Date   • Anemia    • Arthritis     osteoarthritis   • Back pain    • CAD (coronary artery disease)    • Dementia     Early dementia, on Aricept   • GERD (gastroesophageal reflux disease)    • H/O CT scan of abdomen 05/31/2016    interstitial fibrosis, r renal cyst   • History of carotid endarterectomy     left   • History of foreign travel [id number]    [place] x8   • History of transfusion    • History of urinary tract infection 2014   • Hx of CABG    • Hyperlipidemia    • Hypertension    • Hypothyroid    • Hypothyroidism, acquired    • Kidney calculus    • Macular degeneration    • Neuropathy    • Osteoporosis    • Peptic ulceration    • Peripheral neuropathy    • PUD (peptic ulcer disease)    • Pulmonary fibrosis    • Screening for glaucoma 08/15/2012    Dr. Thompson; macular degeneration   • Stomach pain         Past Surgical History:   Procedure Laterality Date   • CAROTID ENDARTERECTOMY  2003   • CATARACT EXTRACTION Right 03/14/2012    Dr. Elder   • COLONOSCOPY     • COLONOSCOPY N/A 10/12/2017    Procedure: COLONOSCOPY TO CECUM AND TERMINAL ILEUM WITH COLD BIOPSY POLYPECTOMY;  Surgeon: Rony Wolfe MD;  Location: [facility] ENDOSCOPY;  Service:    • CORONARY ARTERY BYPASS GRAFT  10/1994   • ENDOSCOPY N/A 6/2/2016    Gastritis    • ENDOSCOPY  08/07/2014    erosive gastritis, duodenitis.   •  ENDOSCOPY N/A 10/12/2017    Procedure: ESOPHAGOGASTRODUODENOSCOPY WITH BIOPSIES;  Surgeon: Rony Wolfe MD;  Location: [facility] ENDOSCOPY;  Service:    • KYPHOPLASTY  2014    at T12        Current Outpatient Prescriptions on File Prior to Visit   Medication Sig Dispense Refill   • calcium citrate-vitamin d (CALCITRATE) 315-250 MG-UNIT tablet tablet Take 1 tablet by mouth Daily.     • Cyanocobalamin (VITAMIN B 12 PO) Take 100 mcg by mouth.     • Diphenhydramine-APAP, sleep, (ACETAMINOPHEN PM PO) Take 1 tablet by mouth As Needed.     • gabapentin (NEURONTIN) 300 MG capsule Take 1 capsule by mouth 2 (Two) Times a Day. 180 capsule 1   • Ginkgo Biloba 100 MG capsule Take 100 mg by mouth Daily.     • levothyroxine (SYNTHROID, LEVOTHROID) 100 MCG tablet Take 1 tablet by mouth Daily. 90 tablet 1   • lisinopril (PRINIVIL,ZESTRIL) 2.5 MG tablet Take 1 tablet by mouth Daily. 90 tablet 1   • Multiple Vitamins-Minerals (PRESERVISION AREDS) capsule Take 1 tablet by mouth Daily.     • pantoprazole (PROTONIX) 40 MG EC tablet Take 1 tablet by mouth Daily. 90 tablet 1   • pravastatin (PRAVACHOL) 20 MG tablet Take 1 tablet by mouth Daily. 90 tablet 1   • rOPINIRole (REQUIP) 1 MG tablet Take 1 tablet by mouth Every Night. Take 1 hour before bedtime. 90 tablet 1     No current facility-administered medications on file prior to visit.         ALLERGIES:    Allergies   Allergen Reactions   • Iodinated Diagnostic Agents Shortness Of Breath        Social History     Social History   • Marital status:      Spouse name: Gunjan   • Number of children: 1   • Years of education: College     Occupational History   • CPA Retired     Social History Main Topics   • Smoking status: Former Smoker     Packs/day: 0.50     Years: 30.00     Types: Cigarettes   • Smokeless tobacco: Never Used   • Alcohol use No   • Drug use: No   • Sexual activity: Defer     Other Topics Concern   • Not on file        Family History   Problem Relation Age of Onset  "  • Cancer Brother         prostate   • Heart disease Brother    • Pancreatic cancer Brother 61   • Coronary artery disease Other    • Heart disease Other    • Heart disease Mother    • Cancer Father    • Leukemia Father 70   • No Known Problems Maternal Grandmother    • No Known Problems Maternal Grandfather    • No Known Problems Paternal Grandmother    • No Known Problems Paternal Grandfather    • Breast cancer Daughter 35   • Heart disease Brother    • Heart failure Brother    • Melanoma Brother    • Malig Hyperthermia Neg Hx         Review of Systems   Constitutional: Positive for activity change, appetite change, fatigue and unexpected weight change. Negative for fever.   HENT: Negative.    Eyes: Negative.    Respiratory: Positive for shortness of breath. Negative for cough, chest tightness and wheezing.    Cardiovascular: Negative for chest pain, palpitations and leg swelling.   Gastrointestinal: Negative for abdominal pain, blood in stool, diarrhea, nausea and vomiting.   Genitourinary: Negative.    Musculoskeletal: Positive for arthralgias, back pain and gait problem. Negative for neck stiffness.   Skin: Negative.    Allergic/Immunologic: Negative for immunocompromised state.   Neurological: Positive for weakness. Negative for dizziness, syncope, light-headedness and headaches.   Hematological: Negative for adenopathy. Does not bruise/bleed easily.   Psychiatric/Behavioral: Positive for sleep disturbance. Negative for agitation and confusion.      ROS performed and unchanged from above-6/7/18    Objective     Vitals:    06/07/18 0925   BP: 112/58   Pulse: 98   Resp: 16   Temp: 97.8 °F (36.6 °C)   TempSrc: Oral   SpO2: 99%   Weight: 50.6 kg (111 lb 9.6 oz)   Height: 165 cm (64.96\")   PainSc: 0-No pain     Current Status 6/7/2018   ECOG score 1       Physical Exam    CON: pleasant, thin, frail appearing elderly man  HEENT: no icterus, no thrush, moist membranes, slight ptosis right eyelid, EOMI  NECK: no " jvd  LYMPH: no cervical or supraclavicular lad  CV: RRR, S1S2, no murmur  RESP: diminished bs, faint crackles bilaterally  GI: soft, non-tender, no splenomegaly, +bs, very thin  MUSC: no edema, unsteady gait, ambulates with a rolling walker today  NEURO: alert and oriented x3, generalized weakness  PSYCH: normal mood    RECENT LABS:  Hematology WBC   Date Value Ref Range Status   05/25/2018 8.94 4.00 - 10.00 10*3/mm3 Final     RBC   Date Value Ref Range Status   05/25/2018 3.60 (L) 4.30 - 5.50 10*6/mm3 Final   05/08/2018 3.61 (L) 4.60 - 6.00 10*6/mm3 Final     Hemoglobin   Date Value Ref Range Status   05/25/2018 11.6 (L) 13.5 - 16.5 g/dL Final     Hematocrit   Date Value Ref Range Status   05/25/2018 34.2 (L) 40.0 - 49.0 % Final     Platelets   Date Value Ref Range Status   05/25/2018 175 150 - 375 10*3/mm3 Final        Lab Results   Component Value Date    GLUCOSE 96 04/15/2016    BUN 26 (H) 05/21/2018    CREATININE 1.35 (H) 05/21/2018    EGFRIFNONA 50 (L) 05/21/2018    EGFRIFAFRI 61 05/21/2018    BCR 19.3 05/21/2018    K 4.1 05/21/2018    CO2 26.0 05/21/2018    CALCIUM 9.6 05/21/2018    PROTENTOTREF 7.7 05/25/2018    ALBUMIN 3.4 05/25/2018    LABIL2 0.8 05/25/2018    AST 15 05/21/2018    ALT 8 05/21/2018     CT CAP--see reports under imaging; reports reviewed by me.    Assessment/Plan     1.  H/O polyclonal IgA: Repeat protein electrophoresis 5/25/18 shows mildly elevated gamma globulin 1.9 with no M spike.  Electrophoresis shows elevated IgG 1603 and IgA 1057 but again no monoclonality.  A serum free light chain ratio was normal 1.15.  Polyclonal gammopathy is a nonspecific finding and typically associated with underlying inflammation.  The ESR is  80 consistent with an inflammatory process.  I see no evidence for multiple myeloma.    2.  Mild macrocytic anemia--B12, folic acid, TSH normal.  Underlying bone marrow disorder such as myelodysplastic syndrome is certainly possible but given the patient's advanced  age and frailty, I do not think a bone marrow exam is warranted at this time.  I would recommend continued monitoring of the CBC.  If the hemoglobin drops persistently below 9-10.0 with symptoms, I would be happy to revisit his case and consider Procrit therapy to boost his hemoglobin    3.  Idiopathic neuropathy    4.  Unusual weight loss secondary to poor appetite/anorexia--colonoscopy and EGD negative 10/2017.  Noncontrasted CT scan of the chest, abdomen, pelvis reviewed today shows pulmonary fibrosis with lung honeycombing, advanced atherosclerotic vascular disease but no clear evidence of lymphadenopathy or malignancy    5.  Pulmonary fibrosis    6.  Family history prostate cancer--negative PSA    Results discussed with the patient, wife and daughter today.  He wife and daughter provided additional information.

## 2018-06-08 ENCOUNTER — APPOINTMENT (OUTPATIENT)
Dept: LAB | Facility: HOSPITAL | Age: 83
End: 2018-06-08

## 2018-06-08 ENCOUNTER — APPOINTMENT (OUTPATIENT)
Dept: ONCOLOGY | Facility: CLINIC | Age: 83
End: 2018-06-08

## 2018-08-21 ENCOUNTER — OFFICE VISIT (OUTPATIENT)
Dept: NEUROLOGY | Facility: CLINIC | Age: 83
End: 2018-08-21

## 2018-08-21 VITALS
OXYGEN SATURATION: 94 % | DIASTOLIC BLOOD PRESSURE: 68 MMHG | HEIGHT: 65 IN | HEART RATE: 82 BPM | SYSTOLIC BLOOD PRESSURE: 124 MMHG

## 2018-08-21 DIAGNOSIS — G60.9 IDIOPATHIC PERIPHERAL NEUROPATHY: Primary | ICD-10-CM

## 2018-08-21 DIAGNOSIS — M48.062 SPINAL STENOSIS OF LUMBAR REGION WITH NEUROGENIC CLAUDICATION: ICD-10-CM

## 2018-08-21 PROCEDURE — 99215 OFFICE O/P EST HI 40 MIN: CPT | Performed by: PSYCHIATRY & NEUROLOGY

## 2018-08-21 RX ORDER — VENLAFAXINE 50 MG/1
50 TABLET ORAL NIGHTLY
Qty: 30 TABLET | Refills: 3 | Status: SHIPPED | OUTPATIENT
Start: 2018-08-21

## 2018-08-21 NOTE — PROGRESS NOTES
CC: Peripheral neuropathy and memory loss    HPI:  Tye Connor is a  85 y.o.  right-handed white male who I am seeing for the first time regarding memory loss and peripheral neuropathy.  Patient had been seen by Dr. Dalal initially 6/27/16 and in follow-up in about one year later 6/16/17.  The patient had memory loss and had been placed on Aricept but he stopped the medication.  Dr. Dalal and recommended a trial of rivastigmine patch but the patient never tried it.  His evaluation has included a head CT and then an MRI of the brain performed a/5/14.  I reviewed the MRI films which show moderate ventricular enlargement with mild cerebral atrophy.  The cerebral aqueduct is enlarged and there is a flow void present.  Turbulence is seen in the third ventricle.  This pattern was recognized by Dr. Dalal as potentially being NPH and she had ordered a cisternogram but the patient decided not to further investigate.    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  He had a bunch and lab tests which she states didn't show specific cause.  Review of labs have demonstrated some abnormalities.  On 8/6/14 a hemoglobin A1c was elevated at 5.9% consistent with prediabetes.  In May of this year testing most recently showed a normal B12 level of 1740 and a folate level greater than 20.  The TSH was normal at 0.575.  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  The free light chains were elevated at 82.8 and the free lambda was elevated at 71.7.  The M the ratio was normal.  Sedimentation rate was 80.  The patient relates a history of alcohol abuse and he is a member of [employer].  He has not drank for 39 years.  He denies a family history of neuropathy.    He also has restless leg syndrome which is treated with 1 mg of Requip at night which does seem to help quite a bit.  He takes gabapentin  300 mg tablets, 2 tablets at night which helps some of his pain at night.  He does have a fair amount of back pain as well as pain up and down his legs with numbness in the feet and lower legs.  He states that he has low back pain all the time and has been told he had spinal stenosis.  He had kyphoplasty for compression fracture of T12 about 3 or 4 years ago but no significant change in his back pain.  He had been ambulating about the house with his walker now he is using the wheelchair.  He is gotten more unsteady and weaker.  He does relate that walking across the house will increase his back and leg pain    Recently CT scans of the chest abdomen and pelvis were done and I reviewed those looking specifically at the spine.  The thoracic spine did not seem to show any significant spinal stenosis.  There are at least 2 levels in the lumbar spine with stenosis that is at least moderate.  The roots appear clumped in the thecal sac at those levels.    Additional symptoms include poor appetite and weight loss.  His normal weight was probably about 145 pounds but over the past couple of years he is lost down to about 110.        Past Medical History:   Diagnosis Date   • Anemia    • Arthritis     osteoarthritis   • Back pain    • CAD (coronary artery disease)    • Dementia     Early dementia, on Aricept   • GERD (gastroesophageal reflux disease)    • H/O CT scan of abdomen 05/31/2016    interstitial fibrosis, r renal cyst   • History of carotid endarterectomy     left   • History of foreign travel [id number]    [place] x8   • History of transfusion    • History of urinary tract infection 2014   • Hx of CABG    • Hyperlipidemia    • Hypertension    • Hypothyroid    • Hypothyroidism, acquired    • Kidney calculus    • Macular degeneration    • Neuropathy    • Osteoporosis    • Peptic ulceration    • Peripheral neuropathy    • PUD (peptic ulcer disease)    • Pulmonary fibrosis (CMS/HCC)    • Screening for glaucoma 08/15/2012     Dr. Thompson; macular degeneration   • Stomach pain          Past Surgical History:   Procedure Laterality Date   • CAROTID ENDARTERECTOMY  2003   • CATARACT EXTRACTION Right 03/14/2012    Dr. Elder   • COLONOSCOPY     • COLONOSCOPY N/A 10/12/2017    Procedure: COLONOSCOPY TO CECUM AND TERMINAL ILEUM WITH COLD BIOPSY POLYPECTOMY;  Surgeon: Rony Wolfe MD;  Location: [facility] ENDOSCOPY;  Service:    • CORONARY ARTERY BYPASS GRAFT  10/1994   • ENDOSCOPY N/A 6/2/2016    Gastritis    • ENDOSCOPY  08/07/2014    erosive gastritis, duodenitis.   • ENDOSCOPY N/A 10/12/2017    Procedure: ESOPHAGOGASTRODUODENOSCOPY WITH BIOPSIES;  Surgeon: Rony Wolfe MD;  Location: [facility] ENDOSCOPY;  Service:    • KYPHOPLASTY  2014    at T12           Current Outpatient Prescriptions:   •  calcium citrate-vitamin d (CALCITRATE) 315-250 MG-UNIT tablet tablet, Take 1 tablet by mouth Daily., Disp: , Rfl:   •  Cyanocobalamin (VITAMIN B 12 PO), Take 100 mcg by mouth., Disp: , Rfl:   •  Diphenhydramine-APAP, sleep, (ACETAMINOPHEN PM PO), Take 1 tablet by mouth As Needed., Disp: , Rfl:   •  gabapentin (NEURONTIN) 300 MG capsule, Take 1 capsule by mouth 2 (Two) Times a Day., Disp: 180 capsule, Rfl: 1  •  Ginkgo Biloba 100 MG capsule, Take 100 mg by mouth Daily., Disp: , Rfl:   •  levothyroxine (SYNTHROID, LEVOTHROID) 100 MCG tablet, Take 1 tablet by mouth Daily., Disp: 90 tablet, Rfl: 1  •  lisinopril (PRINIVIL,ZESTRIL) 2.5 MG tablet, Take 1 tablet by mouth Daily., Disp: 90 tablet, Rfl: 1  •  Multiple Vitamins-Minerals (PRESERVISION AREDS) capsule, Take 1 tablet by mouth Daily., Disp: , Rfl:   •  pantoprazole (PROTONIX) 40 MG EC tablet, Take 1 tablet by mouth Daily., Disp: 90 tablet, Rfl: 1  •  pravastatin (PRAVACHOL) 20 MG tablet, Take 1 tablet by mouth Daily., Disp: 90 tablet, Rfl: 1  •  rOPINIRole (REQUIP) 1 MG tablet, Take 1 tablet by mouth Every Night. Take 1 hour before bedtime., Disp: 90 tablet, Rfl: 1  •  venlafaxine (EFFEXOR) 50 MG  tablet, Take 1 tablet by mouth Every Night., Disp: 30 tablet, Rfl: 3      Family History   Problem Relation Age of Onset   • Cancer Brother         prostate   • Heart disease Brother    • Pancreatic cancer Brother 61   • Coronary artery disease Other    • Heart disease Other    • Heart disease Mother    • Cancer Father    • Leukemia Father 70   • No Known Problems Maternal Grandmother    • No Known Problems Maternal Grandfather    • No Known Problems Paternal Grandmother    • No Known Problems Paternal Grandfather    • Breast cancer Daughter 35   • Heart disease Brother    • Heart failure Brother    • Melanoma Brother    • Malig Hyperthermia Neg Hx          Social History     Social History   • Marital status:      Spouse name: Gunjan   • Number of children: 1   • Years of education: College     Occupational History   • CPA Retired     Social History Main Topics   • Smoking status: Former Smoker     Packs/day: 0.50     Years: 30.00     Types: Cigarettes   • Smokeless tobacco: Never Used   • Alcohol use No   • Drug use: No   • Sexual activity: Defer     Other Topics Concern   • Not on file     Social History Narrative   • No narrative on file         Allergies   Allergen Reactions   • Iodinated Diagnostic Agents Shortness Of Breath         Pain Scale: Currently low back pain 6/10 which may increase to an 8/10.  His leg pain currently is 3/10 but may escalate to a 7/10.        ROS:  Review of Systems   Constitutional: Positive for fatigue. Negative for chills and fever.   HENT: Positive for trouble swallowing (sometimes). Negative for hearing loss and tinnitus.    Eyes: Positive for visual disturbance. Negative for pain, redness and itching.   Respiratory: Positive for shortness of breath and wheezing. Negative for cough.    Cardiovascular: Negative for chest pain, palpitations and leg swelling.   Gastrointestinal: Negative for constipation, diarrhea, nausea and vomiting.   Endocrine: Negative for cold  "intolerance, heat intolerance and polydipsia.   Genitourinary: Negative for decreased urine volume, difficulty urinating, frequency and urgency.   Musculoskeletal: Positive for back pain and gait problem (at home uses walker). Negative for neck pain and neck stiffness.   Skin: Negative for color change, rash and wound.   Allergic/Immunologic: Negative for environmental allergies, food allergies and immunocompromised state.   Neurological: Positive for weakness (legs) and numbness (legs arms). Negative for dizziness, tremors, seizures, syncope, facial asymmetry, speech difficulty, light-headedness and headaches.   Hematological: Negative for adenopathy. Does not bruise/bleed easily.   Psychiatric/Behavioral: Positive for sleep disturbance (sometimes). Negative for agitation, behavioral problems, confusion, decreased concentration, dysphoric mood, hallucinations and self-injury. The patient is not nervous/anxious and is not hyperactive.            Physical Exam:  Vitals:    08/21/18 1558   BP: 124/68   Pulse: 82   SpO2: 94%   Height: 165 cm (64.96\")     There is no height or weight on file to calculate BMI.  Previous recent BMI was 18.6 as of 6/7/18    Physical Exam  Gen.: Underweight white male no acute distress.  HEENT: Normocephalic no evidence of trauma.  Discs flat.  No A-V nicking.  Throat negative.  Neck: Supple.  He has kyphosis.  No thyromegaly.  No cervical bruits.  Radial pulses were strong and simultaneous.  Heart: Regular rate and rhythm without murmurs.  Mild pedal edema present      Neurological Exam:   Mental status: Awake alert oriented and conversant.  Some short-term memory trouble but long-term memory seems quite good.  His affect might be slightly depressed but not really overtly.  HCF: No aphasia with questionable dyspraxia on exam.  Memory is mildly affected but long-term memory is intact and recall of recent events is intact  Cranial nerves: 2-12 intact  Motor: Normal tone in the arms and legs. "  There is diffuse muscle atrophy.  No fasciculations are seen.  There is pretty good power in all muscles tested proximally but the intrinsic hand muscles are mildly weak to moderately weak.  Toe extension and flexion are mildly weak.  Ankle dorsiflexion and proximal muscles are really pretty strong.  Reflexes: Diffusely +1 including ankle jerks with trace with downgoing toe signs.  Sensory: There is reduced light touch in the fingers.  In the legs from about one half of the way down the leg there is reduced light touch and pinprick is down at the level of the foot.  Vibration sense was reduced at the ankles and position sense was intact in the great toes.  Cerebellar: Finger to nose rapid movements were intact  Gait and Station-patient is in a wheelchair          Results:      Lab Results   Component Value Date    GLUCOSE 96 04/15/2016    BUN 26 (H) 05/21/2018    CREATININE 1.35 (H) 05/21/2018    EGFRIFNONA 50 (L) 05/21/2018    EGFRIFAFRI 61 05/21/2018    BCR 19.3 05/21/2018    CO2 26.0 05/21/2018    CALCIUM 9.6 05/21/2018    PROTENTOTREF 7.7 05/25/2018    ALBUMIN 3.4 05/25/2018    LABIL2 0.8 05/25/2018    AST 15 05/21/2018    ALT 8 05/21/2018       Lab Results   Component Value Date    WBC 8.94 05/25/2018    HGB 11.6 (L) 05/25/2018    HCT 34.2 (L) 05/25/2018    MCV 95.0 05/25/2018     05/25/2018         .No results found for: RPR      Lab Results   Component Value Date    TSH 0.575 05/08/2018         Lab Results   Component Value Date    XLLGGAYK09 1,740 (H) 05/21/2018         Lab Results   Component Value Date    FOLATE >20.00 05/21/2018         Lab Results   Component Value Date    HGBA1C 5.9 (H) 08/06/2014     Reviewed Dr. Dalal's notes ×2  Reviewed MRI of brain  Reviewed chest and abdominal/pelvic CT scan looking at the spinal column          Assessment:   1.  Memory loss-more like MCI or just early dementia.  He refused to try Exelon patch after a short trial of Aricept.  The patient has weight  loss and poor appetite and so cholinesterase inhibitors may potentiate this and so are probably contraindicated.  2.  Peripheral neuropathy, idiopathic-however there are some clues that may be related such as prediabetes, hypothyroidism or associated with an MGUS.  However he doesn't actually have an MGUS on the current protein electrophoresis/IEP.  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  He previously declined EMG  3.  Spinal stenosis-suspect neurogenic claudication-at 85 he has additional medical issues which make a big spine operation high risk   4.  Restless leg syndrome pretty well controlled on Requip 1 mg nightly      Plan:  1.  I had a lengthy discussion with him his wife and his 2 daughters.  I think that cholinesterase inhibitors are simply contraindicated due to his poor appetite and weight loss.  2.  Origin of his neuropathy may simply be prediabetes or somehow related to his gammopathy although I am not as convinced that the gammopathy has anything to do with it.  Otherwise it's idiopathic.  Options for pain control would include escalating the dosage of gabapentin-he indicates that daytime doses makes him too sluggish and he is drowsy during the day as it is.  Lyrica has similar potential side effects.  Options to try an SNRI on top of the gabapentin is reasonable.  We'll try Effexor 50 mg at night about an hour before bed for about a month and if no significant effect or side effects with escalated dosage 200 mg at night which may eventually be escalated to 150.  Not sure higher dose is any better plus his drowsiness is a definite issue.  3.  Follow-up in about 5 weeks with a nurse practitioner.            At least 25 minutes of this 40 minute follow-up were spent counseling the patient and his family about his main diagnoses, the workup of such and treatment of such including what further investigation could be considered if he were a candidate for a spine operation or if and  autoimmune form of neuropathy were thought to be more likely.              Dictated utilizing Dragon dictation.

## 2018-09-04 ENCOUNTER — OFFICE VISIT (OUTPATIENT)
Dept: FAMILY MEDICINE CLINIC | Facility: CLINIC | Age: 83
End: 2018-09-04

## 2018-09-04 VITALS
DIASTOLIC BLOOD PRESSURE: 62 MMHG | HEART RATE: 69 BPM | HEIGHT: 65 IN | TEMPERATURE: 97.4 F | RESPIRATION RATE: 16 BRPM | OXYGEN SATURATION: 94 % | SYSTOLIC BLOOD PRESSURE: 102 MMHG | BODY MASS INDEX: 17.66 KG/M2 | WEIGHT: 106 LBS

## 2018-09-04 DIAGNOSIS — I10 ESSENTIAL HYPERTENSION: ICD-10-CM

## 2018-09-04 DIAGNOSIS — K21.9 GASTROESOPHAGEAL REFLUX DISEASE, ESOPHAGITIS PRESENCE NOT SPECIFIED: ICD-10-CM

## 2018-09-04 DIAGNOSIS — K21.9 GASTROESOPHAGEAL REFLUX DISEASE WITHOUT ESOPHAGITIS: ICD-10-CM

## 2018-09-04 DIAGNOSIS — G25.81 RLS (RESTLESS LEGS SYNDROME): ICD-10-CM

## 2018-09-04 DIAGNOSIS — M54.50 CHRONIC BILATERAL LOW BACK PAIN WITHOUT SCIATICA: ICD-10-CM

## 2018-09-04 DIAGNOSIS — G89.29 CHRONIC BILATERAL LOW BACK PAIN WITHOUT SCIATICA: ICD-10-CM

## 2018-09-04 DIAGNOSIS — J84.10 PULMONARY FIBROSIS (HCC): ICD-10-CM

## 2018-09-04 DIAGNOSIS — G62.9 NEUROPATHY: Primary | ICD-10-CM

## 2018-09-04 DIAGNOSIS — D89.0 POLYCLONAL GAMMOPATHY: ICD-10-CM

## 2018-09-04 PROCEDURE — 99214 OFFICE O/P EST MOD 30 MIN: CPT | Performed by: FAMILY MEDICINE

## 2018-09-04 RX ORDER — PRAVASTATIN SODIUM 20 MG
20 TABLET ORAL DAILY
Qty: 90 TABLET | Refills: 1 | Status: SHIPPED | OUTPATIENT
Start: 2018-09-04

## 2018-09-04 RX ORDER — LISINOPRIL 2.5 MG/1
2.5 TABLET ORAL DAILY
Qty: 90 TABLET | Refills: 1 | Status: CANCELLED | OUTPATIENT
Start: 2018-09-04

## 2018-09-04 RX ORDER — ROPINIROLE 1 MG/1
1 TABLET, FILM COATED ORAL NIGHTLY
Qty: 90 TABLET | Refills: 1 | Status: SHIPPED | OUTPATIENT
Start: 2018-09-04 | End: 2019-03-15 | Stop reason: SDUPTHER

## 2018-09-04 RX ORDER — PANTOPRAZOLE SODIUM 40 MG/1
40 TABLET, DELAYED RELEASE ORAL DAILY
Qty: 90 TABLET | Refills: 1 | Status: SHIPPED | OUTPATIENT
Start: 2018-09-04

## 2018-09-04 RX ORDER — GABAPENTIN 300 MG/1
300 CAPSULE ORAL 2 TIMES DAILY
Qty: 180 CAPSULE | Refills: 1 | Status: SHIPPED | OUTPATIENT
Start: 2018-09-04

## 2018-09-04 RX ORDER — LEVOTHYROXINE SODIUM 0.1 MG/1
100 TABLET ORAL DAILY
Qty: 90 TABLET | Refills: 1 | Status: SHIPPED | OUTPATIENT
Start: 2018-09-04

## 2018-09-04 NOTE — PATIENT INSTRUCTIONS
Exercise 30 minutes most days of the week  Sleep 6-8 hours each night if possible  Low fat, low cholesterol diet   we discussed prescribed medications and how to take them   make sure you get results of any labs/studies ordered today  Low glycemic index diet .   Notify me of any falls.  Continue same medications.  I will find out if lisinopril 2.5 mg was refilled and if you're still on that medication.  I will have the nurse call you in the morning.

## 2018-09-04 NOTE — PROGRESS NOTES
Subjective   Chief Complaint:   Chief Complaint   Patient presents with   • Hypertension   • Hypothyroidism         History of Present Illness             Tye Connor 85 y.o. male who presents today for Medical Management of the below listed issues and medication refills.    See below.no falls  Walked with walker. No falls    Discussed his  Specialists he sees.  Discussed recent labs done in may. He sees [facility].  Still has back pain.  Back pain is severe.  But he has had no falls lately.  Weight is stable at 106 pounds.  I refilled his gabapentin and his Requip.  [facility] sees [facility] for his polyclonal gammopathy.  I reviewed his recent labs from the [facility].  I filled the remainder of his medications.   he has a problem list of   Patient Active Problem List   Diagnosis   • Back pain   • Hx of CABG   • Hyperlipidemia   • Hypothyroidism, acquired   • Kidney calculus   • Neuropathy   • History of carotid endarterectomy   • Essential hypertension   • Acid reflux   • Gastric ulcer   • Compression fracture of thoracic vertebra (CMS/HCC)   • Hypertrophic polyarthritis   • Gait instability   • Memory impairment   • Pulmonary fibrosis (CMS/HCC)   • Early satiety   • Epigastric pain   • Weight loss   • Macrocytic anemia   • Polyclonal gammopathy   .  Since the last visit, he has overall felt well.  he has been compliant with   Current Outpatient Prescriptions:   •  calcium citrate-vitamin d (CALCITRATE) 315-250 MG-UNIT tablet tablet, Take 1 tablet by mouth Daily., Disp: , Rfl:   •  Cyanocobalamin (VITAMIN B 12 PO), Take 100 mcg by mouth., Disp: , Rfl:   •  Diphenhydramine-APAP, sleep, (ACETAMINOPHEN PM PO), Take 1 tablet by mouth As Needed., Disp: , Rfl:   •  gabapentin (NEURONTIN) 300 MG capsule, Take 1 capsule by mouth 2 (Two) Times a Day., Disp: 180 capsule, Rfl: 1  •  Ginkgo Biloba 100 MG capsule, Take 100 mg by mouth Daily., Disp: , Rfl:   •  levothyroxine (SYNTHROID, LEVOTHROID) 100 MCG tablet, Take 1 tablet  "by mouth Daily., Disp: 90 tablet, Rfl: 1  •  lisinopril (PRINIVIL,ZESTRIL) 2.5 MG tablet, Take 1 tablet by mouth Daily., Disp: 90 tablet, Rfl: 1  •  Multiple Vitamins-Minerals (PRESERVISION AREDS) capsule, Take 1 tablet by mouth Daily., Disp: , Rfl:   •  pantoprazole (PROTONIX) 40 MG EC tablet, Take 1 tablet by mouth Daily., Disp: 90 tablet, Rfl: 1  •  pravastatin (PRAVACHOL) 20 MG tablet, Take 1 tablet by mouth Daily., Disp: 90 tablet, Rfl: 1  •  rOPINIRole (REQUIP) 1 MG tablet, Take 1 tablet by mouth Every Night. Take 1 hour before bedtime., Disp: 90 tablet, Rfl: 1  •  venlafaxine (EFFEXOR) 50 MG tablet, Take 1 tablet by mouth Every Night., Disp: 30 tablet, Rfl: 3.  he denies medication side effects.    All of the chronic condition(s) listed above are stable w/o issues.    /62   Pulse 69   Temp 97.4 °F (36.3 °C)   Resp 16   Ht 165 cm (64.96\")   Wt 48.1 kg (106 lb)   SpO2 94%   BMI 17.66 kg/m²     Results for orders placed or performed in visit on 05/25/18   Protein Electrophoresis, Total   Result Value Ref Range    Total Protein 7.7 6.0 - 8.5 g/dL    Albumin 3.4 2.9 - 4.4 g/dL    Alpha-1-Globulin 0.3 0.0 - 0.4 g/dL    Alpha-2-Globulin 0.9 0.4 - 1.0 g/dL    Beta Globulin 1.3 0.7 - 1.3 g/dL    Gamma Globulin 1.9 (H) 0.4 - 1.8 g/dL    M-Bryce Not Observed Not Observed g/dL    Globulin 4.3 (H) 2.2 - 3.9 g/dL    A/G Ratio 0.8 0.7 - 1.7    Please note Comment    Immunofixation, Serum   Result Value Ref Range    Immunofixation Result, Serum Comment     IgG 1603 (H) 700 - 1600 mg/dL    IgA 1057 (H) 61 - 437 mg/dL    IgM 53 15 - 143 mg/dL   Sedimentation Rate   Result Value Ref Range    Sed Rate 80 (H) 0 - 20 mm/hr   Immunoglobulin Free LT Chains Blood   Result Value Ref Range    Free Light Chain, Kappa 82.8 (H) 3.3 - 19.4 mg/L    Free Lambda Light Chains 71.7 (H) 5.7 - 26.3 mg/L    Kappa/Lambda Ratio 1.15 0.26 - 1.65       I refilled his gabapentin and Requip.      The following portions of the patient's " history were reviewed and updated as appropriate: allergies, current medications, past family history, past medical history, past social history, past surgical history and problem list.    Review of Systems   Constitutional: Positive for fatigue. Negative for activity change, appetite change, fever and unexpected weight change.   HENT: Negative for congestion, sore throat and trouble swallowing.    Eyes: Negative for visual disturbance.   Respiratory: Negative for apnea and shortness of breath.    Cardiovascular: Negative for chest pain and leg swelling.   Gastrointestinal: Negative for abdominal distention, abdominal pain, nausea and vomiting.   Genitourinary: Negative for decreased urine volume, flank pain and urgency.   Musculoskeletal: Positive for arthralgias, back pain and gait problem.   Skin: Negative for rash.   Neurological: Negative for dizziness, weakness, numbness and headaches.   Psychiatric/Behavioral: Negative for confusion.       Objective   Physical Exam   Constitutional: He is oriented to person, place, and time. No distress.   HENT:   Head: Normocephalic.   Mouth/Throat: No oropharyngeal exudate.   Eyes: Pupils are equal, round, and reactive to light.   Neck: Normal range of motion.   Cardiovascular: Normal rate and regular rhythm.    No murmur heard.  Pulmonary/Chest: Effort normal and breath sounds normal. No respiratory distress. He has no rales.   Abdominal: Soft. He exhibits no distension and no mass.   Musculoskeletal: He exhibits no edema, tenderness or deformity.   Lymphadenopathy:     He has no cervical adenopathy.   Neurological: He is alert and oriented to person, place, and time. No sensory deficit.   Skin: No rash noted.   Psychiatric: He has a normal mood and affect. His behavior is normal. Judgment and thought content normal.   Nursing note and vitals reviewed.      Assessment/Plan   Tye was seen today for hypertension and hypothyroidism.    Diagnoses and all orders for this  visit:    Neuropathy  -     gabapentin (NEURONTIN) 300 MG capsule; Take 1 capsule by mouth 2 (Two) Times a Day.    Essential hypertension    Gastroesophageal reflux disease without esophagitis  -     pantoprazole (PROTONIX) 40 MG EC tablet; Take 1 tablet by mouth Daily.    Gastroesophageal reflux disease, esophagitis presence not specified  -     pravastatin (PRAVACHOL) 20 MG tablet; Take 1 tablet by mouth Daily.    RLS (restless legs syndrome)  -     rOPINIRole (REQUIP) 1 MG tablet; Take 1 tablet by mouth Every Night. Take 1 hour before bedtime.    Pulmonary fibrosis (CMS/HCC)    Chronic bilateral low back pain without sciatica    Other orders  -     levothyroxine (SYNTHROID, LEVOTHROID) 100 MCG tablet; Take 1 tablet by mouth Daily.  -     Cancel: lisinopril (PRINIVIL,ZESTRIL) 2.5 MG tablet; Take 1 tablet by mouth Daily.

## 2018-11-02 ENCOUNTER — TRANSCRIBE ORDERS (OUTPATIENT)
Dept: ADMINISTRATIVE | Facility: HOSPITAL | Age: 83
End: 2018-11-02

## 2018-11-02 DIAGNOSIS — J84.10 POSTINFLAMMATORY PULMONARY FIBROSIS (HCC): Primary | ICD-10-CM

## 2018-11-07 ENCOUNTER — HOSPITAL ENCOUNTER (OUTPATIENT)
Dept: CT IMAGING | Facility: HOSPITAL | Age: 83
Discharge: HOME | End: 2018-11-07
Attending: INTERNAL MEDICINE | Admitting: INTERNAL MEDICINE

## 2018-11-07 DIAGNOSIS — J84.10 POSTINFLAMMATORY PULMONARY FIBROSIS (HCC): ICD-10-CM

## 2018-11-07 PROCEDURE — 71250 CT THORAX DX C-: CPT

## 2018-12-10 ENCOUNTER — OFFICE VISIT (OUTPATIENT)
Dept: FAMILY MEDICINE CLINIC | Facility: CLINIC | Age: 83
End: 2018-12-10

## 2018-12-10 VITALS
BODY MASS INDEX: 18.33 KG/M2 | DIASTOLIC BLOOD PRESSURE: 64 MMHG | HEIGHT: 65 IN | SYSTOLIC BLOOD PRESSURE: 86 MMHG | OXYGEN SATURATION: 94 % | HEART RATE: 104 BPM | WEIGHT: 110 LBS

## 2018-12-10 DIAGNOSIS — M79.642 PAIN OF LEFT HAND: Primary | ICD-10-CM

## 2018-12-10 PROCEDURE — 99212 OFFICE O/P EST SF 10 MIN: CPT | Performed by: PHYSICIAN ASSISTANT

## 2018-12-10 NOTE — PROGRESS NOTES
Subjective   Tye Connor is a 85 y.o. male.     History of Present Illness   Patient complains of left thumb pain. The symptoms began yesterday.  Pain is a result of no known event started yesterday and ? Hyperextended.  . Pain is located mcp left thumb region. Discomfort is described as aching. Symptoms are exacerbated by pressure applied to area and movement.  Evaluation to date: none. Therapy to date includes: ice and APAP; some stiffness  ROM pain  Here with son and wants Xray and will send [place] or come back tomorrow  Need to image d/t underweight, hypothyroid, and hx osteoporosis to rule out fx  On Neurontin for neuropathy.    Mobile [phone] [facility]    The following portions of the patient's history were reviewed and updated as appropriate: allergies, current medications, past family history, past medical history, past social history, past surgical history and problem list.    Review of Systems   Constitutional: Negative for activity change, appetite change and unexpected weight change.   HENT: Negative for nosebleeds and trouble swallowing.    Eyes: Negative for pain and visual disturbance.   Respiratory: Negative for chest tightness, shortness of breath and wheezing.    Cardiovascular: Negative for chest pain and palpitations.   Gastrointestinal: Negative for abdominal pain and blood in stool.   Endocrine: Negative.    Genitourinary: Negative for difficulty urinating and hematuria.   Musculoskeletal: Negative for joint swelling.   Skin: Negative for color change and rash.   Allergic/Immunologic: Negative.    Neurological: Negative for syncope and speech difficulty.   Hematological: Negative for adenopathy.   Psychiatric/Behavioral: Negative for agitation and confusion.   All other systems reviewed and are negative.      Objective   Physical Exam   Constitutional: He is oriented to person, place, and time.   NAD; frail   HENT:   Head: Normocephalic.   Musculoskeletal: Normal range of motion. He  exhibits edema and tenderness. He exhibits no deformity.   Pulses normal; warm skin; sore left thumb metacarpal; no erythema; will Xray    Sore in thumb metacarpal to move thumb in all directions; sore to touch; will try Voltaren gel   Neurological: He is alert and oriented to person, place, and time. Coordination normal.   Skin: Skin is warm. Capillary refill takes less than 2 seconds. No rash noted. No erythema. No pallor.   Psychiatric: His behavior is normal. Judgment and thought content normal.   Nursing note and vitals reviewed.      Assessment/Plan   Tye was seen today for hand pain.    Diagnoses and all orders for this visit:    Pain of left hand  -     XR Hand 3+ View Left (In Office); Future    Other orders  -     diclofenac (VOLTAREN) 1 % gel gel; Apply 2 g topically to the appropriate area as directed 4 (Four) Times a Day. Over area of pain left hand

## 2019-03-15 DIAGNOSIS — G25.81 RLS (RESTLESS LEGS SYNDROME): ICD-10-CM

## 2019-03-15 RX ORDER — ROPINIROLE 1 MG/1
1 TABLET, FILM COATED ORAL NIGHTLY
Qty: 90 TABLET | Refills: 1 | Status: SHIPPED | OUTPATIENT
Start: 2019-03-15

## 2019-06-10 ENCOUNTER — APPOINTMENT (OUTPATIENT)
Dept: GENERAL RADIOLOGY | Facility: HOSPITAL | Age: 84
End: 2019-06-10

## 2019-06-10 ENCOUNTER — HOSPITAL ENCOUNTER (EMERGENCY)
Facility: HOSPITAL | Age: 84
Discharge: HOME | End: 2019-06-10
Attending: EMERGENCY MEDICINE | Admitting: EMERGENCY MEDICINE

## 2019-06-10 VITALS
HEIGHT: 69 IN | BODY MASS INDEX: 14.1 KG/M2 | HEART RATE: 74 BPM | WEIGHT: 95.19 LBS | TEMPERATURE: 98.6 F | DIASTOLIC BLOOD PRESSURE: 73 MMHG | OXYGEN SATURATION: 98 % | RESPIRATION RATE: 18 BRPM | SYSTOLIC BLOOD PRESSURE: 134 MMHG

## 2019-06-10 DIAGNOSIS — R55 SYNCOPE, UNSPECIFIED SYNCOPE TYPE: Primary | ICD-10-CM

## 2019-06-10 LAB
ALBUMIN SERPL-MCNC: 3.2 G/DL (ref 3.5–5.2)
ALBUMIN/GLOB SERPL: 0.7 G/DL
ALP SERPL-CCNC: 59 U/L (ref 39–117)
ALT SERPL W P-5'-P-CCNC: 16 U/L (ref 1–41)
ANION GAP SERPL CALCULATED.3IONS-SCNC: 15 MMOL/L
AST SERPL-CCNC: 30 U/L (ref 1–40)
BILIRUB SERPL-MCNC: 0.7 MG/DL (ref 0.2–1.2)
BUN BLD-MCNC: 19 MG/DL (ref 8–23)
BUN/CREAT SERPL: 18.6 (ref 7–25)
CALCIUM SPEC-SCNC: 9.3 MG/DL (ref 8.6–10.5)
CHLORIDE SERPL-SCNC: 94 MMOL/L (ref 98–107)
CK SERPL-CCNC: 45 U/L (ref 20–200)
CO2 SERPL-SCNC: 28 MMOL/L (ref 22–29)
CREAT BLD-MCNC: 1.02 MG/DL (ref 0.76–1.27)
DEPRECATED RDW RBC AUTO: 50.3 FL (ref 37–54)
EOSINOPHIL # BLD MANUAL: 0.14 10*3/MM3 (ref 0–0.4)
EOSINOPHIL NFR BLD MANUAL: 2 % (ref 0.3–6.2)
ERYTHROCYTE [DISTWIDTH] IN BLOOD BY AUTOMATED COUNT: 12.4 % (ref 12.3–15.4)
GFR SERPL CREATININE-BSD FRML MDRD: 69 ML/MIN/1.73
GLOBULIN UR ELPH-MCNC: 4.7 GM/DL
GLUCOSE BLD-MCNC: 152 MG/DL (ref 65–99)
HCT VFR BLD AUTO: 33.5 % (ref 37.5–51)
HGB BLD-MCNC: 10.1 G/DL (ref 13–17.7)
HOLD SPECIMEN: NORMAL
HOLD SPECIMEN: NORMAL
LYMPHOCYTES # BLD MANUAL: 0.65 10*3/MM3 (ref 0.7–3.1)
LYMPHOCYTES NFR BLD MANUAL: 1 % (ref 5–12)
LYMPHOCYTES NFR BLD MANUAL: 9.1 % (ref 19.6–45.3)
MACROCYTES BLD QL SMEAR: ABNORMAL
MAGNESIUM SERPL-MCNC: 2.2 MG/DL (ref 1.6–2.4)
MCH RBC QN AUTO: 33.3 PG (ref 26.6–33)
MCHC RBC AUTO-ENTMCNC: 30.1 G/DL (ref 31.5–35.7)
MCV RBC AUTO: 110.6 FL (ref 79–97)
MONOCYTES # BLD AUTO: 0.07 10*3/MM3 (ref 0.1–0.9)
MYELOCYTES NFR BLD MANUAL: 1 % (ref 0–0)
NEUTROPHILS # BLD AUTO: 6.18 10*3/MM3 (ref 1.7–7)
NEUTROPHILS NFR BLD MANUAL: 86.9 % (ref 42.7–76)
PLAT MORPH BLD: NORMAL
PLATELET # BLD AUTO: 172 10*3/MM3 (ref 140–450)
PMV BLD AUTO: 10.1 FL (ref 6–12)
POLYCHROMASIA BLD QL SMEAR: ABNORMAL
POTASSIUM BLD-SCNC: 4.3 MMOL/L (ref 3.5–5.2)
PROT SERPL-MCNC: 7.9 G/DL (ref 6–8.5)
RBC # BLD AUTO: 3.03 10*6/MM3 (ref 4.14–5.8)
SODIUM BLD-SCNC: 137 MMOL/L (ref 136–145)
TROPONIN T SERPL-MCNC: <0.01 NG/ML (ref 0–0.03)
WBC MORPH BLD: NORMAL
WBC NRBC COR # BLD: 7.11 10*3/MM3 (ref 3.4–10.8)
WHOLE BLOOD HOLD SPECIMEN: NORMAL
WHOLE BLOOD HOLD SPECIMEN: NORMAL

## 2019-06-10 PROCEDURE — 85025 COMPLETE CBC W/AUTO DIFF WBC: CPT | Performed by: EMERGENCY MEDICINE

## 2019-06-10 PROCEDURE — 84484 ASSAY OF TROPONIN QUANT: CPT | Performed by: EMERGENCY MEDICINE

## 2019-06-10 PROCEDURE — 85007 BL SMEAR W/DIFF WBC COUNT: CPT | Performed by: EMERGENCY MEDICINE

## 2019-06-10 PROCEDURE — 71046 X-RAY EXAM CHEST 2 VIEWS: CPT

## 2019-06-10 PROCEDURE — 99284 EMERGENCY DEPT VISIT MOD MDM: CPT

## 2019-06-10 PROCEDURE — 83735 ASSAY OF MAGNESIUM: CPT | Performed by: EMERGENCY MEDICINE

## 2019-06-10 PROCEDURE — 80053 COMPREHEN METABOLIC PANEL: CPT | Performed by: EMERGENCY MEDICINE

## 2019-06-10 PROCEDURE — 93010 ELECTROCARDIOGRAM REPORT: CPT | Performed by: INTERNAL MEDICINE

## 2019-06-10 PROCEDURE — 93005 ELECTROCARDIOGRAM TRACING: CPT | Performed by: EMERGENCY MEDICINE

## 2019-06-10 PROCEDURE — 82550 ASSAY OF CK (CPK): CPT | Performed by: EMERGENCY MEDICINE

## 2019-06-10 RX ORDER — SODIUM CHLORIDE 0.9 % (FLUSH) 0.9 %
10 SYRINGE (ML) INJECTION AS NEEDED
Status: DISCONTINUED | OUTPATIENT
Start: 2019-06-10 | End: 2019-06-10 | Stop reason: HOSPADM

## 2019-06-10 NOTE — ED TRIAGE NOTES
Pt arrives with c/o syncopal episode while having a bowel movement this afternoon. Per EMS, patient's son performed compressions, no confirmation of the patient ever losing a pulse.

## 2019-06-10 NOTE — ED PROVIDER NOTES
" EMERGENCY DEPARTMENT ENCOUNTER    CHIEF COMPLAINT  Chief Complaint: syncope  History given by: patient and family  History limited by: dementia  Room Number: 12/12  PMD: Hakeem Kothari MD      HPI:  Pt is a 86 y.o. male who presents complaining of syncopal episode after having a BM this afternoon. Family reports that the pt said that the \"wanted to get up\" and then became unresponsive. They report that they could not find a pulse on the pt and began to perform CPR for about a minute. Pt then woke up and was coherent upon waking. Pt denies CP, SOA, nausea, and vomiting, however, due to the pt's dementia, he does not fully remember the episode. Family also reports hearing a \"crack\" during CP. Pt's denies any complaints at this time.    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   • Gait instability 06/27/2016   • Memory impairment 06/27/2016   • Pulmonary fibrosis (CMS/[facility]) 10/18/2016   • Early satiety 10/02/2017   • Epigastric pain 10/02/2017   • Weight loss 10/02/2017   • Macrocytic anemia 10/02/2017   • 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     Resolved Ambulatory Problems     Diagnosis Date Noted   • No Resolved Ambulatory Problems     Past Medical History:   Diagnosis Date   • Anemia    • Arthritis    • Back pain    • CAD (coronary artery disease)    • Dementia    • GERD (gastroesophageal reflux disease)    • H/O CT scan of abdomen 05/31/2016   • History of carotid endarterectomy    • History of foreign travel [id number]   • History of transfusion    • History of urinary tract infection 2014   • Hx of CABG    • Hyperlipidemia    • " Hypertension    • Hypothyroid    • Hypothyroidism, acquired    • Kidney calculus    • Macular degeneration    • Neuropathy    • Osteoporosis    • Peptic ulceration    • Peripheral neuropathy    • PUD (peptic ulcer disease)    • Pulmonary fibrosis (CMS/HCC)    • Screening for glaucoma 08/15/2012   • Stomach pain        PAST SURGICAL HISTORY  Past Surgical History:   Procedure Laterality Date   • CAROTID ENDARTERECTOMY  2003   • CATARACT EXTRACTION Right 03/14/2012    Dr. Elder   • COLONOSCOPY     • COLONOSCOPY N/A 10/12/2017    Procedure: COLONOSCOPY TO CECUM AND TERMINAL ILEUM WITH COLD BIOPSY POLYPECTOMY;  Surgeon: Rony Wolfe MD;  Location: [facility] ENDOSCOPY;  Service:    • CORONARY ARTERY BYPASS GRAFT  10/1994   • ENDOSCOPY N/A 6/2/2016    Gastritis    • ENDOSCOPY  08/07/2014    erosive gastritis, duodenitis.   • ENDOSCOPY N/A 10/12/2017    Procedure: ESOPHAGOGASTRODUODENOSCOPY WITH BIOPSIES;  Surgeon: Rony Wolfe MD;  Location: [facility] ENDOSCOPY;  Service:    • KYPHOPLASTY  2014    at T12       FAMILY HISTORY  Family History   Problem Relation Age of Onset   • Cancer Brother         prostate   • Heart disease Brother    • Pancreatic cancer Brother 61   • Coronary artery disease Other    • Heart disease Other    • Heart disease Mother    • Cancer Father    • Leukemia Father 70   • No Known Problems Maternal Grandmother    • No Known Problems Maternal Grandfather    • No Known Problems Paternal Grandmother    • No Known Problems Paternal Grandfather    • Breast cancer Daughter 35   • Heart disease Brother    • Heart failure Brother    • Melanoma Brother    • Malig Hyperthermia Neg Hx        SOCIAL HISTORY  Social History     Socioeconomic History   • Marital status:      Spouse name: Gunjan   • Number of children: 1   • Years of education: College   • Highest education level: Not on file   Occupational History   • Occupation: CPA     Employer: RETIRED   Tobacco Use   • Smoking status: Former Smoker      Packs/day: 0.50     Years: 30.00     Pack years: 15.00     Types: Cigarettes   • Smokeless tobacco: Never Used   Substance and Sexual Activity   • Alcohol use: No   • Drug use: No   • Sexual activity: Defer       ALLERGIES  Iodinated diagnostic agents    REVIEW OF SYSTEMS  Review of Systems   Constitutional: Negative for activity change and appetite change.   Respiratory: Negative for chest tightness and shortness of breath.    Cardiovascular: Negative for chest pain, palpitations and leg swelling.   Gastrointestinal: Negative for abdominal pain and nausea.   Skin: Negative for pallor.   Neurological: Positive for syncope. Negative for dizziness, weakness, numbness and headaches.       PHYSICAL EXAM  ED Triage Vitals   Temp Heart Rate Resp BP SpO2   06/10/19 1412 06/10/19 1412 06/10/19 1418 06/10/19 1412 06/10/19 1412   98.6 °F (37 °C) 100 16 125/73 99 %      Temp src Heart Rate Source Patient Position BP Location FiO2 (%)   -- -- -- -- --              Physical Exam   Constitutional: He is oriented to person, place, and time. He appears cachectic. No distress.   HENT:   Head: Normocephalic and atraumatic.   Eyes: EOM are normal. Pupils are equal, round, and reactive to light.   Neck: Normal range of motion. Neck supple.   Cardiovascular: Normal rate, regular rhythm and normal heart sounds.   Pulmonary/Chest: Effort normal and breath sounds normal. No respiratory distress. He exhibits tenderness (lower).   Abdominal: Soft. He exhibits no mass. There is tenderness (upper). There is no rebound and no guarding.   Musculoskeletal: Normal range of motion. He exhibits no edema.   Neurological: He is alert and oriented to person, place, and time. He has normal sensation and normal strength.   Skin: Skin is warm and dry.   Psychiatric: Mood and affect normal.   Nursing note and vitals reviewed.      LAB RESULTS  Lab Results (last 24 hours)     Procedure Component Value Units Date/Time    CBC & Differential [[id number]]  Collected:  06/10/19 1439    Specimen:  Blood Updated:  06/10/19 1543    Narrative:       The following orders were created for panel order CBC & Differential.  Procedure                               Abnormality         Status                     ---------                               -----------         ------                     CBC Auto Differential[[id number]]        Abnormal            Final result                 Please view results for these tests on the individual orders.    Comprehensive Metabolic Panel [[id number]]  (Abnormal) Collected:  06/10/19 1439    Specimen:  Blood Updated:  06/10/19 1558     Glucose 152 mg/dL      BUN 19 mg/dL      Creatinine 1.02 mg/dL      Sodium 137 mmol/L      Potassium 4.3 mmol/L      Chloride 94 mmol/L      CO2 28.0 mmol/L      Calcium 9.3 mg/dL      Total Protein 7.9 g/dL      Albumin 3.20 g/dL      ALT (SGPT) 16 U/L      AST (SGOT) 30 U/L      Alkaline Phosphatase 59 U/L      Total Bilirubin 0.7 mg/dL      eGFR Non African Amer 69 mL/min/1.73      Globulin 4.7 gm/dL      A/G Ratio 0.7 g/dL      BUN/Creatinine Ratio 18.6     Anion Gap 15.0 mmol/L     Narrative:       GFR Normal >60  Chronic Kidney Disease <60  Kidney Failure <15    Troponin [[id number]]  (Normal) Collected:  06/10/19 1439    Specimen:  Blood Updated:  06/10/19 1558     Troponin T <0.010 ng/mL     Narrative:       Troponin T Reference Range:  <= 0.03 ng/mL-   Negative for AMI  >0.03 ng/mL-     Abnormal for myocardial necrosis.  Clinicians would have to utilize clinical acumen, EKG, Troponin and serial changes to determine if it is an Acute Myocardial Infarction or myocardial injury due to an underlying chronic condition.     CK [[id number]]  (Normal) Collected:  06/10/19 1439    Specimen:  Blood Updated:  06/10/19 1558     Creatine Kinase 45 U/L     Magnesium [[id number]]  (Normal) Collected:  06/10/19 1439    Specimen:  Blood Updated:  06/10/19 1558     Magnesium 2.2 mg/dL     CBC Auto Differential [[id number]]   (Abnormal) Collected:  06/10/19 1439    Specimen:  Blood Updated:  06/10/19 1543     WBC 7.11 10*3/mm3      RBC 3.03 10*6/mm3      Hemoglobin 10.1 g/dL      Hematocrit 33.5 %      .6 fL      MCH 33.3 pg      MCHC 30.1 g/dL      RDW 12.4 %      RDW-SD 50.3 fl      MPV 10.1 fL      Platelets 172 10*3/mm3     Manual Differential [[id number]]  (Abnormal) Collected:  06/10/19 1439    Specimen:  Blood Updated:  06/10/19 1543     Neutrophil % 86.9 %      Lymphocyte % 9.1 %      Monocyte % 1.0 %      Eosinophil % 2.0 %      Myelocyte % 1.0 %      Neutrophils Absolute 6.18 10*3/mm3      Lymphocytes Absolute 0.65 10*3/mm3      Monocytes Absolute 0.07 10*3/mm3      Eosinophils Absolute 0.14 10*3/mm3      Macrocytes Slight/1+     Polychromasia Slight/1+     WBC Morphology Normal     Platelet Morphology Normal          I ordered the above labs and reviewed the results    RADIOLOGY  Xr Chest 2 View    Result Date: 6/10/2019  Narrative: TWO-VIEW CHEST  HISTORY: Shortness of breath. Extensive pulmonary fibrosis.  FINDINGS: The lungs are moderately expanded with very extensive diffuse changes of pulmonary fibrosis as also seen on the chest CT scan dated 11/07/2018. The scattered changes are more prominent when compared to an earlier chest x-ray dated 01/23/2017 and I suspect this relates to superimposed areas of pneumonia on the current exam. The heart remains borderline enlarged with sternal wires from previous cardiac surgery.  This report was finalized on 6/10/2019 4:34 PM by Dr. Brock Thompson M.D.      I ordered the above noted radiological studies. Interpreted by radiologist. Reviewed by me in PACS.       PROCEDURES  Procedures  EKG          EKG time: 1559  Rhythm/Rate: NSR 75 with occasional PAC  P waves and KS: nml  QRS, axis: nml   ST and T waves: nonspecific ST changes     Interpreted Contemporaneously by me, independently viewed  Unchanged compared to prior 8/4/14      PROGRESS AND CONSULTS     1511- Ordered CXR,  EKG, and labs.     1619- Rechecked pt. Pt is resting comfortably. Notified pt and his family of his CXR, EKG, and lab results. I used shared decision-making with the pt and his family to decide admission versus discharge. After this discussion, the family and the pt have decided to go home, I informed them of the risks of doing so and the signs to RTED. Discussed the plan to d/c the pt . Pt understands and agrees with the plan, all questions answered.    MEDICAL DECISION MAKING  Results were reviewed/discussed with the patient and they were also made aware of online access. Pt also made aware that some labs, such as cultures, will not be resulted during ER visit and follow up with PMD is necessary.     MDM  Number of Diagnoses or Management Options  Syncope, unspecified syncope type:      Amount and/or Complexity of Data Reviewed  Clinical lab tests: ordered and reviewed (HGB 10.1)  Tests in the radiology section of CPT®: ordered and reviewed (CXR- The lungs are moderately expanded with very extensive diffuse changes of pulmonary fibrosis as also seen on the chest CT scan dated 11/07/2018. The scattered changes are more prominent when compared to an earlier chest x-ray dated 01/23/2017 and I suspect this relates to superimposed areas of pneumonia on the current exam. The heart remains borderline enlarged with sternal wires from previous cardiac surgery. )  Tests in the medicine section of CPT®: reviewed and ordered (See EKG note.)  Decide to obtain previous medical records or to obtain history from someone other than the patient: yes  Independent visualization of images, tracings, or specimens: yes    Patient Progress  Patient progress: stable         DIAGNOSIS  Final diagnoses:   Syncope, unspecified syncope type       DISPOSITION  DISCHARGE    Patient discharged in stable condition.    Reviewed implications of results, diagnosis, meds, responsibility to follow up, warning signs and symptoms of possible worsening,  potential complications and reasons to return to ER.    Patient/Family voiced understanding of above instructions.    Discussed plan for discharge, as there is no emergent indication for admission. Patient referred to primary care provider for BP management due to today's BP. Pt/family is agreeable and understands need for follow up and repeat testing.  Pt is aware that discharge does not mean that nothing is wrong but it indicates no emergency is present that requires admission and they must continue care with follow-up as given below or physician of their choice.     FOLLOW-UP  Hkaeem Kothari MD  [id number] [id number]  [phone]    Schedule an appointment as soon as possible for a visit   If symptoms worsen or any other concerns         Medication List      No changes were made to your prescriptions during this visit.       Latest Documented Vital Signs:  As of 4:38 PM  BP- 125/73 HR- 100 Temp- 98.6 °F (37 °C) O2 sat- 99%    --  Documentation assistance provided by melani Espinoza for Dr. Lorraine MD.  Information recorded by the scribe was done at my direction and has been verified and validated by me.     Hakeem Espinoza  06/10/19 5039       Nura Peters MD  06/10/19 7095

## (undated) DEVICE — Device: Brand: DEFENDO AIR/WATER/SUCTION AND BIOPSY VALVE

## (undated) DEVICE — FRCP BX RADJAW4 NDL 2.8 240CM LG OG BX40

## (undated) DEVICE — THE TORRENT IRRIGATION SCOPE CONNECTOR IS USED WITH THE TORRENT IRRIGATION TUBING TO PROVIDE IRRIGATION FLUIDS SUCH AS STERILE WATER DURING GASTROINTESTINAL ENDOSCOPIC PROCEDURES WHEN USED IN CONJUNCTION WITH AN IRRIGATION PUMP (OR ELECTROSURGICAL UNIT).: Brand: TORRENT

## (undated) DEVICE — TUBING, SUCTION, 1/4" X 10', STRAIGHT: Brand: MEDLINE

## (undated) DEVICE — CANN NASL CO2 TRULINK W/O2 A/

## (undated) DEVICE — BITEBLOCK OMNI BLOC